# Patient Record
Sex: MALE | Race: BLACK OR AFRICAN AMERICAN
[De-identification: names, ages, dates, MRNs, and addresses within clinical notes are randomized per-mention and may not be internally consistent; named-entity substitution may affect disease eponyms.]

---

## 2017-05-09 ENCOUNTER — HOSPITAL ENCOUNTER (INPATIENT)
Dept: HOSPITAL 62 - ER | Age: 57
Discharge: ANOTHER HOSPITAL | DRG: 208 | End: 2017-05-09
Attending: FAMILY MEDICINE | Admitting: FAMILY MEDICINE
Payer: OTHER GOVERNMENT

## 2017-05-09 VITALS — SYSTOLIC BLOOD PRESSURE: 148 MMHG | DIASTOLIC BLOOD PRESSURE: 93 MMHG

## 2017-05-09 DIAGNOSIS — N17.9: ICD-10-CM

## 2017-05-09 DIAGNOSIS — M19.90: ICD-10-CM

## 2017-05-09 DIAGNOSIS — Z87.891: ICD-10-CM

## 2017-05-09 DIAGNOSIS — K85.90: ICD-10-CM

## 2017-05-09 DIAGNOSIS — J96.01: Primary | ICD-10-CM

## 2017-05-09 DIAGNOSIS — Z82.49: ICD-10-CM

## 2017-05-09 DIAGNOSIS — F43.10: ICD-10-CM

## 2017-05-09 DIAGNOSIS — E87.2: ICD-10-CM

## 2017-05-09 DIAGNOSIS — J44.9: ICD-10-CM

## 2017-05-09 DIAGNOSIS — I50.9: ICD-10-CM

## 2017-05-09 DIAGNOSIS — I11.0: ICD-10-CM

## 2017-05-09 DIAGNOSIS — E83.42: ICD-10-CM

## 2017-05-09 DIAGNOSIS — F10.20: ICD-10-CM

## 2017-05-09 DIAGNOSIS — I42.9: ICD-10-CM

## 2017-05-09 DIAGNOSIS — I21.19: ICD-10-CM

## 2017-05-09 DIAGNOSIS — Z79.899: ICD-10-CM

## 2017-05-09 DIAGNOSIS — Z78.1: ICD-10-CM

## 2017-05-09 DIAGNOSIS — Z84.89: ICD-10-CM

## 2017-05-09 DIAGNOSIS — E86.0: ICD-10-CM

## 2017-05-09 DIAGNOSIS — E72.20: ICD-10-CM

## 2017-05-09 LAB
ALBUMIN SERPL-MCNC: 5.1 G/DL (ref 3.5–5)
ALP SERPL-CCNC: 84 U/L (ref 38–126)
ALT SERPL-CCNC: 68 U/L (ref 21–72)
ANION GAP SERPL CALC-SCNC: 32 MMOL/L (ref 5–19)
ANION GAP SERPL CALC-SCNC: 39 MMOL/L (ref 5–19)
APPEARANCE UR: CLEAR
APTT BLD: 25.9 SEC (ref 23.5–35.8)
AST SERPL-CCNC: 85 U/L (ref 17–59)
BARBITURATES UR QL SCN: NEGATIVE
BASE EXCESS BLDA CALC-SCNC: -13.2 MMOL/L
BASE EXCESS BLDA CALC-SCNC: -15.1 MMOL/L
BASE EXCESS BLDV CALC-SCNC: -18.4 MMOL/L
BASOPHILS # BLD AUTO: 0.1 10^3/UL (ref 0–0.2)
BASOPHILS NFR BLD AUTO: 0.4 % (ref 0–2)
BILIRUB DIRECT SERPL-MCNC: 1.1 MG/DL (ref 0–0.4)
BILIRUB SERPL-MCNC: 2.2 MG/DL (ref 0.2–1.3)
BILIRUB UR QL STRIP: NEGATIVE
BUN SERPL-MCNC: 20 MG/DL (ref 7–20)
BUN SERPL-MCNC: 24 MG/DL (ref 7–20)
CALCIUM: 8.8 MG/DL (ref 8.4–10.2)
CALCIUM: 9.5 MG/DL (ref 8.4–10.2)
CHLORIDE SERPL-SCNC: 93 MMOL/L (ref 98–107)
CHLORIDE SERPL-SCNC: 94 MMOL/L (ref 98–107)
CK MB SERPL-MCNC: 12.7 NG/ML (ref ?–4.55)
CK MB SERPL-MCNC: 17.5 NG/ML (ref ?–4.55)
CK SERPL-CCNC: 766 U/L (ref 55–170)
CK SERPL-CCNC: 804 U/L (ref 55–170)
CO2 SERPL-SCNC: 12 MMOL/L (ref 22–30)
CO2 SERPL-SCNC: 8 MMOL/L (ref 22–30)
CREAT SERPL-MCNC: 1.74 MG/DL (ref 0.52–1.25)
CREAT SERPL-MCNC: 1.82 MG/DL (ref 0.52–1.25)
D DIMER PPP FEU-MCNC: 5.33 UG/ML (ref 0–0.5)
EOSINOPHIL # BLD AUTO: 0 10^3/UL (ref 0–0.6)
EOSINOPHIL NFR BLD AUTO: 0 % (ref 0–6)
ERYTHROCYTE [DISTWIDTH] IN BLOOD BY AUTOMATED COUNT: 15.7 % (ref 11.5–14)
ETHANOL SERPL-MCNC: < 10 MG/DL
GLUCOSE SERPL-MCNC: 139 MG/DL (ref 75–110)
GLUCOSE SERPL-MCNC: 275 MG/DL (ref 75–110)
GLUCOSE UR STRIP-MCNC: NEGATIVE MG/DL
HCO3 BLDV-SCNC: 10.2 MMOL/L (ref 20–32)
HCT VFR BLD CALC: 51.2 % (ref 37.9–51)
HGB BLD-MCNC: 16.9 G/DL (ref 13.5–17)
HGB HCT DIFFERENCE: -0.5
KETONES UR STRIP-MCNC: 80 MG/DL
LIPASE SERPL-CCNC: 9881.1 U/L (ref 23–300)
LYMPHOCYTES # BLD AUTO: 1.4 10^3/UL (ref 0.5–4.7)
LYMPHOCYTES NFR BLD AUTO: 9.1 % (ref 13–45)
MCH RBC QN AUTO: 31.4 PG (ref 27–33.4)
MCHC RBC AUTO-ENTMCNC: 33 G/DL (ref 32–36)
MCV RBC AUTO: 95 FL (ref 80–97)
METHADONE UR QL SCN: NEGATIVE
MONOCYTES # BLD AUTO: 1.2 10^3/UL (ref 0.1–1.4)
MONOCYTES NFR BLD AUTO: 7.8 % (ref 3–13)
NEUTROPHILS # BLD AUTO: 12.4 10^3/UL (ref 1.7–8.2)
NEUTS SEG NFR BLD AUTO: 82.7 % (ref 42–78)
NITRITE UR QL STRIP: NEGATIVE
PCO2 BLDV: 33.8 MMHG (ref 35–63)
PCP UR QL SCN: NEGATIVE
PH BLDV: 7.1 [PH] (ref 7.3–7.42)
PH UR STRIP: 5 [PH] (ref 5–9)
POTASSIUM SERPL-SCNC: 3 MMOL/L (ref 3.6–5)
POTASSIUM SERPL-SCNC: 3.4 MMOL/L (ref 3.6–5)
PROT SERPL-MCNC: 8.9 G/DL (ref 6.3–8.2)
PROT UR STRIP-MCNC: 30 MG/DL
PROTHROMBIN TIME: 13.2 SEC (ref 11.4–15.4)
RBC # BLD AUTO: 5.38 10^6/UL (ref 4.35–5.55)
SAO2 % BLDA: 95.2 % (ref 94–98)
SAO2 % BLDA: 99.5 % (ref 94–98)
SODIUM SERPL-SCNC: 137.9 MMOL/L (ref 137–145)
SODIUM SERPL-SCNC: 140.3 MMOL/L (ref 137–145)
SP GR UR STRIP: 1.01
TROPONIN I SERPL-MCNC: 0.03 NG/ML
TROPONIN I SERPL-MCNC: 0.04 NG/ML
TSH SERPL-ACNC: 2.05 UIU/ML (ref 0.47–4.68)
URINE OPIATES LOW: NEGATIVE
UROBILINOGEN UR-MCNC: NEGATIVE MG/DL (ref ?–2)
WBC # BLD AUTO: 15 10^3/UL (ref 4–10.5)

## 2017-05-09 PROCEDURE — 85730 THROMBOPLASTIN TIME PARTIAL: CPT

## 2017-05-09 PROCEDURE — C1751 CATH, INF, PER/CENT/MIDLINE: HCPCS

## 2017-05-09 PROCEDURE — 84484 ASSAY OF TROPONIN QUANT: CPT

## 2017-05-09 PROCEDURE — 96376 TX/PRO/DX INJ SAME DRUG ADON: CPT

## 2017-05-09 PROCEDURE — 80307 DRUG TEST PRSMV CHEM ANLYZR: CPT

## 2017-05-09 PROCEDURE — 94002 VENT MGMT INPAT INIT DAY: CPT

## 2017-05-09 PROCEDURE — 02HV33Z INSERTION OF INFUSION DEVICE INTO SUPERIOR VENA CAVA, PERCUTANEOUS APPROACH: ICD-10-PCS | Performed by: INTERNAL MEDICINE

## 2017-05-09 PROCEDURE — 83735 ASSAY OF MAGNESIUM: CPT

## 2017-05-09 PROCEDURE — 0BH17EZ INSERTION OF ENDOTRACHEAL AIRWAY INTO TRACHEA, VIA NATURAL OR ARTIFICIAL OPENING: ICD-10-PCS | Performed by: INTERNAL MEDICINE

## 2017-05-09 PROCEDURE — 93010 ELECTROCARDIOGRAM REPORT: CPT

## 2017-05-09 PROCEDURE — 84439 ASSAY OF FREE THYROXINE: CPT

## 2017-05-09 PROCEDURE — 87077 CULTURE AEROBIC IDENTIFY: CPT

## 2017-05-09 PROCEDURE — 96361 HYDRATE IV INFUSION ADD-ON: CPT

## 2017-05-09 PROCEDURE — 74000: CPT

## 2017-05-09 PROCEDURE — 85025 COMPLETE CBC W/AUTO DIFF WBC: CPT

## 2017-05-09 PROCEDURE — 80048 BASIC METABOLIC PNL TOTAL CA: CPT

## 2017-05-09 PROCEDURE — 83036 HEMOGLOBIN GLYCOSYLATED A1C: CPT

## 2017-05-09 PROCEDURE — 85379 FIBRIN DEGRADATION QUANT: CPT

## 2017-05-09 PROCEDURE — 84443 ASSAY THYROID STIM HORMONE: CPT

## 2017-05-09 PROCEDURE — 80053 COMPREHEN METABOLIC PANEL: CPT

## 2017-05-09 PROCEDURE — 5A1935Z RESPIRATORY VENTILATION, LESS THAN 24 CONSECUTIVE HOURS: ICD-10-PCS | Performed by: INTERNAL MEDICINE

## 2017-05-09 PROCEDURE — 93005 ELECTROCARDIOGRAM TRACING: CPT

## 2017-05-09 PROCEDURE — 87086 URINE CULTURE/COLONY COUNT: CPT

## 2017-05-09 PROCEDURE — 82803 BLOOD GASES ANY COMBINATION: CPT

## 2017-05-09 PROCEDURE — 83690 ASSAY OF LIPASE: CPT

## 2017-05-09 PROCEDURE — 83605 ASSAY OF LACTIC ACID: CPT

## 2017-05-09 PROCEDURE — 36415 COLL VENOUS BLD VENIPUNCTURE: CPT

## 2017-05-09 PROCEDURE — 82140 ASSAY OF AMMONIA: CPT

## 2017-05-09 PROCEDURE — 99291 CRITICAL CARE FIRST HOUR: CPT

## 2017-05-09 PROCEDURE — 96365 THER/PROPH/DIAG IV INF INIT: CPT

## 2017-05-09 PROCEDURE — 87040 BLOOD CULTURE FOR BACTERIA: CPT

## 2017-05-09 PROCEDURE — 81001 URINALYSIS AUTO W/SCOPE: CPT

## 2017-05-09 PROCEDURE — 82550 ASSAY OF CK (CPK): CPT

## 2017-05-09 PROCEDURE — 0D9670Z DRAINAGE OF STOMACH WITH DRAINAGE DEVICE, VIA NATURAL OR ARTIFICIAL OPENING: ICD-10-PCS | Performed by: FAMILY MEDICINE

## 2017-05-09 PROCEDURE — 82553 CREATINE MB FRACTION: CPT

## 2017-05-09 PROCEDURE — 96375 TX/PRO/DX INJ NEW DRUG ADDON: CPT

## 2017-05-09 PROCEDURE — 94640 AIRWAY INHALATION TREATMENT: CPT

## 2017-05-09 PROCEDURE — 51702 INSERT TEMP BLADDER CATH: CPT

## 2017-05-09 PROCEDURE — 85610 PROTHROMBIN TIME: CPT

## 2017-05-09 PROCEDURE — 71010: CPT

## 2017-05-09 RX ADMIN — PROPOFOL PRN ML: 10 INJECTION, EMULSION INTRAVENOUS at 11:55

## 2017-05-09 RX ADMIN — MAGNESIUM SULFATE IN DEXTROSE SCH ML: 10 INJECTION, SOLUTION INTRAVENOUS at 05:14

## 2017-05-09 RX ADMIN — MAGNESIUM SULFATE IN DEXTROSE SCH ML: 10 INJECTION, SOLUTION INTRAVENOUS at 09:59

## 2017-05-09 RX ADMIN — PROPOFOL PRN ML: 10 INJECTION, EMULSION INTRAVENOUS at 09:02

## 2017-05-09 RX ADMIN — SODIUM CHLORIDE PRN ML: 9 INJECTION, SOLUTION INTRAVENOUS at 08:53

## 2017-05-09 RX ADMIN — MAGNESIUM SULFATE IN DEXTROSE SCH ML: 10 INJECTION, SOLUTION INTRAVENOUS at 06:14

## 2017-05-09 RX ADMIN — POTASSIUM CHLORIDE SCH ML: 29.8 INJECTION, SOLUTION INTRAVENOUS at 09:53

## 2017-05-09 RX ADMIN — POTASSIUM CHLORIDE SCH ML: 29.8 INJECTION, SOLUTION INTRAVENOUS at 11:37

## 2017-05-09 RX ADMIN — SODIUM CHLORIDE PRN ML: 9 INJECTION, SOLUTION INTRAVENOUS at 08:31

## 2017-05-09 RX ADMIN — MAGNESIUM SULFATE IN DEXTROSE SCH ML: 10 INJECTION, SOLUTION INTRAVENOUS at 11:41

## 2017-05-09 NOTE — PDOC TRANSFER SUMMARY
General


Admission Date/PCP: 


  05/09/17 07:47





  CALLY RIZVI, 





Transfer Date: 05/09/17


Accepting Facility: [facility]


Resuscitation Status: Full Code





- Transfer Diagnosis


(1) STEMI (ST elevation myocardial infarction)


Is this a current diagnosis for this admission?: Yes





(2) Respiratory failure


Is this a current diagnosis for this admission?: Yes





(3) Hypomagnesemia


Is this a current diagnosis for this admission?: Yes





(4) Alcohol dependency


Is this a current diagnosis for this admission?: Yes





(5) Hypertension


Is this a current diagnosis for this admission?: Yes





(6) High anion gap metabolic acidosis


Is this a current diagnosis for this admission?: Yes





(7) Elevated lipase


Is this a current diagnosis for this admission?: Yes





(8) Hyperammonemia


Is this a current diagnosis for this admission?: Yes








- Transfer Medications


Home Medications: 


 





Unobtainable [Unobtainable]  05/09/17 








Transfer Medications: 


 Current Medications





Acetaminophen (Tylenol 325 Mg Tablet)  325 mg NG Q4HP PRN


   PRN Reason: FOR PAIN OR TEMP


   Stop: 06/08/17 07:46


Acetylcysteine (Mucomist 20% Soln 800 Mg/4 Ml)  600 mg NEB RTBID LISSET


   Stop: 06/08/17 19:59


Albuterol/Ipratropium (Duoneb 3 Ml Ampul)  3 ml NEB RTQ6 LISSET


   Stop: 06/08/17 07:59


   Last Admin: 05/09/17 09:50 Dose:  3 ml


Atorvastatin Calcium (Lipitor 80 Mg Tablet)  80 mg NG QHS LISSET


   Stop: 06/08/17 21:59


Dextrose (Dextrose Inj 50% Syringe (25 Gm/50 Ml))  12.5 gm IV PRN PRN; Protocol


   PRN Reason: FOR BG 50-69 IN ALERT PATIENT


   Stop: 06/08/17 07:51


Dextrose (Dextrose Inj 50% Syringe (25 Gm/50 Ml))  25 gm IV PRN PRN


   PRN Reason: Protocol


   Stop: 06/08/17 07:51


Fentanyl Citrate (Sublimaze Inj/Pf 100 Mcg/2 Ml Ampule)  50 mcg IV Q4HP PRN


   PRN Reason: PAIN


   Stop: 05/16/17 07:53


Glucagon (Glucagen Inj 1 Mg Vial)  1 mg IM PRN PRN; Protocol


   PRN Reason: Evaluate for BG < 70


   Stop: 06/08/17 07:51


Glucose (Glutose 40% Gel 15 Gm Tube)  30 gm PO PRN PRN; Protocol


   PRN Reason: FOR BG < 50 IN ALERT PATIENT 


   Stop: 06/08/17 07:51


Glucose (Glutose 40% Gel 15 Gm Tube)  15 gm PO PRN PRN; Protocol


   PRN Reason: FOR BG 50-69 IN ALERT PATIENT


   Stop: 06/08/17 07:51


Heparin Sodium (Porcine) (Heparin Inj 5,000 Units/Ml 1 Ml Syringe)  5,000 unit 

SUBCUT Q8 LISSET


   Stop: 06/08/17 13:59


Heparin Sodium (Porcine) (Heparin Inj 1,000 Unit/Ml 10 Ml Vial)  0 - 15,000 

unit IV .BOLUS PER PROTOCOL PRN; Protocol


   PRN Reason: RESPOND TO aPTT VALUE


   Stop: 06/08/17 08:37


Magnesium Sulfate/Dextrose (Magnesium Sulfate Rtu-D5w 1 Gm/100 Ml Premix)  1 gm 

in 100 mls @ 100 mls/hr IV Q1H [facility]


   Stop: 05/09/17 12:59


   Last Admin: 05/09/17 09:59 Dose:  100 ml


Potassium Chloride/Water (Potassium Chloride Royce 20 Meq/50 Ml)  20 meq in 50 

mls @ 25 mls/hr IV Q2H [facility]


   Stop: 05/09/17 14:29


   Last Admin: 05/09/17 09:53 Dose:  50 ml


Midazolam HCl (Versed Rtu 50 Mg/100 Ml Premix Bag)  100 mls @ 0 mls/hr IV 

CONTINUOUS PRN; Protocol; Titrate


   PRN Reason: THIS MED IS NOT "PRN"


   Stop: 05/16/17 07:44


Propofol (Diprivan Rtu 1000 Mg/100 Ml Inf.Bottle)  100 mls @ 0 mls/hr IV 

CONTINUOUS PRN; Protocol; Titrate


   PRN Reason: THIS MED IS NOT "PRN"


   Stop: 06/08/17 07:44


   Last Admin: 05/09/17 09:02 Dose:  100 ml


Thiamine HCl 100 mg/ Folic (Acid 1 mg/ Sodium Chloride)  51.2 mls @ 100 mls/hr 

IV DAILY [facility]


   Stop: 06/08/17 09:59


   Last Admin: 05/09/17 09:59 Dose:  100 mg


Heparin Sodium/Dextrose (Heparin Rtu 25,000 Unit/250 Ml D5w Premix)  250 mls @ 

0 mls/hr IV CONTINUOUS PRN; Protocol; Titrate


   PRN Reason: THIS MED IS NOT "PRN"


   Stop: 06/08/17 08:37


   Last Admin: 05/09/17 11:17 Dose:  250 ml


Sodium Chloride (Nacl 0.9% 1000 Ml Iv Soln)  3,000 mls @ 200 mls/hr IV BOLUS ONE


   Stop: 05/09/17 23:47


Sodium Chloride (Nacl 0.9% 1000 Ml Iv Soln)  1,000 mls @ 100 mls/hr IV 

CONTINUOUS PRN


   PRN Reason: THIS MED IS NOT "PRN"


   Stop: 06/08/17 07:46


Sodium Bicarbonate 150 meq/ (Dextrose)  1,000 mls @ 100 mls/hr IV CONTINUOUS PRN


   PRN Reason: THIS MED IS NOT "PRN"


   Stop: 06/08/17 11:01


Insulin Human Lispro (Humalog Insulin 100 Unit/1 Ml 3 Ml Vial)  0 - 12 unit 

SUBCUT Q6HP PRN


   PRN Reason: Protocol


   Stop: 06/08/17 07:51


Levalbuterol HCl (Xopenex Neb 1.25 Mg/3 Ml Ampul)  1.25 mg NEB RTQ2HP PRN


   PRN Reason: SHORTNESS OF BREATH


   Stop: 06/08/17 07:46


Lorazepam (Ativan Inj 2 Mg/1 Ml Vial)  2 mg IV Q2HP PRN


   PRN Reason: AGITATION


   Stop: 05/16/17 07:53


Methylprednisolone Sodium Succinate (Solu-Medrol Inj/Pf 125 Mg/2 Ml Sdv)  125 

mg IV Q8 LISSET


   Stop: 06/08/17 13:59


Metoprolol Tartrate (Lopressor Inj/Pf 5 Mg/5 Ml Sdv)  2.5 mg IV Q6 LISSET


   Stop: 06/08/17 11:59


Ondansetron HCl (Zofran Inj/Pf 4 Mg/2 Ml Sdv)  4 mg IV Q6HP PRN


   PRN Reason: FOR NAUSEA/VOMITING


   Stop: 06/08/17 07:46


Pantoprazole Sodium (Protonix Iv Inj 40 Mg Vial)  40 mg IV Q12 LISSET


   Stop: 05/12/17 09:59


Pharmacy Profile Note (Medication Communication Order)  1 each  .NOTICE NR


   Stop: 06/08/17 07:44


Sodium Bicarbonate (Sodium Bicarbonate 8.4% Inj 50 Meq/50ml Syrin)  50 meq IV 

NOW ONE


   Stop: 05/09/17 11:31


   Last Admin: 05/09/17 11:07 Dose:  50 meq


Sodium Chloride (Saline Flush 2.5 Ml Monoject Prefil Syrin)  2.5 ml IV Q8 LISSET


   Stop: 06/08/17 13:59











- Allergies


Allergies/Adverse Reactions: 


 





No Known Allergies Allergy (Verified 05/09/17 05:01)


 











Hospital Course


Hospital Course: 


Patient is a 56-year-old American male who was seen this morning at the request 

of the emergency department.   History is unobtainable secondary to this and 

all history is obtained via the ER documentation, review of records, and 

discussion with the ER physician, .  Patient presented with 

complaints of shortness of breath was found be saturating 87% on room air.  

Patient received SoluMedrol, DuoNeb, and Ativan and subsequently began having 

acute respiratory distress and was intubated.  Any remaining history is 

obtained from the record.  





Patient was then transferred to the ICU.  Evaluation was continued here and 

repeat EKG revealed a inferior STEMI with reciprocal depressions.  Patient was 

given aspirin, started on heparin, given Lipitor and immediate transfer was 

established for this patient.  Cardiology, Dr. DUQUE, evaluated this patient has 

at this time recommends against lytic therapy due to patient's risk for 

bleeding due to his underlying alcoholism and inability to obtain a history.





Patient was hospitalized in this facility in October 2016 for delirium tremens 

and possible seizure and during that evaluation patient was found to have a 

history of cardiomyopathy with an grossly normal EF in 2015 and mild diastolic 

dysfunction.  Patient at that time had been restarted on his medications and 

discharged in stable condition.  Currently it is unknown if take patient takes 

any medications, but per review of his previous stay patient is to be on Coreg.





Physical Exam


Vital Signs: 


 











Temp Pulse Resp BP Pulse Ox


 


 97.8 F   130 H  18   150/106 H  100 


 


 05/09/17 04:20  05/09/17 04:07  05/09/17 09:37  05/09/17 09:37  05/09/17 09:37











Exam: 


General: Intubated and sedated


HEENT: AT/NC, PERRL, EOMI, oropharynx is moist, pink, mild scleral icterus, no 

conjunctival injection


Neck: + JVD, trachea midline


Chest: Clear to auscultation bilaterally, no wheezes rhonchi or rales


CV: Tachycardic, Regular rate and rhythm, normal S1 and S2, no rub or gallop; +

sm llsb


Abdomen: Soft, distended, active bowel sounds; no rigidity


Extremities: No cyanosis, clubbing or edema








Results


Laboratory Results: 


 











  05/09/17 05/09/17 05/09/17





  08:45 08:45 08:54


 


Carbonic Acid   


 


HCO3/H2CO3 Ratio   


 


ABG pH   


 


ABG pCO2   


 


ABG pO2   


 


ABG HCO3   


 


ABG O2 Saturation   


 


ABG Base Excess   


 


FiO2   


 


Ammonia   52.1 H 


 


Lipase  9881.1 H  


 


Urine Color    YELLOW


 


Urine Appearance    CLEAR


 


Urine pH    5.0


 


Ur Specific [place]    1.011


 


Urine Protein    30 H


 


Urine Glucose (UA)    NEGATIVE


 


Urine Ketones    80 H


 


Urine Blood    LARGE H


 


Urine Nitrite    NEGATIVE


 


Ur Leukocyte Esterase    NEGATIVE


 


Urine WBC (Auto)    0


 


Urine RBC (Auto)    1














  05/09/17





  09:45


 


Carbonic Acid  0.79 L


 


HCO3/H2CO3 Ratio  13:1


 


ABG pH  7.22 L


 


ABG pCO2  26.4 L


 


ABG pO2  270.5 H


 


ABG HCO3  10.7 L


 


ABG O2 Saturation  99.5 H


 


ABG Base Excess  -15.1


 


FiO2  100%


 


Ammonia 


 


Lipase 


 


Urine Color 


 


Urine Appearance 


 


Urine pH 


 


Ur Specific Gravity 


 


Urine Protein 


 


Urine Glucose (UA) 


 


Urine Ketones 


 


Urine Blood 


 


Urine Nitrite 


 


Ur Leukocyte Esterase 


 


Urine WBC (Auto) 


 


Urine RBC (Auto) 








 











  05/09/17 05/09/17





  08:45 08:45


 


Creatine Kinase  766 H 


 


CK-MB (CK-2)   12.70 H


 


Troponin I   0.032











EKG Comments: 


EKG--inferior STEMI, reciprocal depression


Impressions: 


 





KUB X-Ray  05/09/17 07:44


IMPRESSION:  Nasogastric tube extends below the diaphragm.  Bowel distention 

noted.


 








Chest X-Ray  05/09/17 09:47


IMPRESSION:  Endotracheal tube tip midtrachea.


Nasogastric tube tip in the distal esophagus.


Minimal retrocardiac atelectasis


 














Plan


Time Spent: Greater than 30 Minutes

## 2017-05-09 NOTE — CONSULTATION REPORT E
Consultation Report



NAME: KRISTA DAILY

MRN:  [id number]               : 1960      AGE: 56Y

DATE: 2017     602  A



TO:   KIEL ALFORD M.D.



FROM: ALY GALO M.D.

      Requesting Physician



CHIEF COMPLAINT:

Patient admitted with acute hypoxic respiratory failure, now with EKG

showing ST-segment elevation in I and aVL leads and mild ST-segment

depression in inferior leads which is very suggestive of an ST-elevation

MI.



HISTORY OF PRESENT ILLNESS:

Note that the patient is intubated and sedated, and there are no relatives

around to get a history.  History obtained from the patient's chart and

also with discussions with Dr. Galo, the hospitalist on the case.



The patient is a 56-year-old male who came to the Emergency Room with

shortness of breath, and on room air, his O2 saturation was 87%.  In spite

of Solu-Medrol and Ativan, the patient went into acute respiratory failure

and was intubated.  His EKG at 8:45 a.m. this morning showed ST-segment

elevation in the inferior leads suggestive of acute inferior MI.  Also,

there was some mild ST-segment depression in I and aVL, but the patient's

lipase was also very high in 9000 plus range, and this also could give

rise to sometimes ST-segment elevation in inferior leads suggestive of a

pseudo ST-elevation MI in the inferior wall.  At present, the patient is

intubated.  Blood pressure is stable.  He also has significant metabolic

acidosis, see lab results below.



PAST MEDICAL HISTORY:

As per the chart is positive for history of congestive heart failure,

history of cardiomyopathy, history of hypertension.  There is no history

of coronary artery disease or MI.  The patient has a history of COPD.  He

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  He has a history of

alcohol dependency, post-traumatic stress disorder.



PAST SURGICAL HISTORY:

Orthopedic surgery in the neck.



SOCIAL HISTORY:

The patient is a former smoker.  The patient drinks heavily, last reported

a fifth of gin a day.



FAMILY HISTORY:

Positive for hypertension and mother  at age 74 with Alzheimer's.



ALLERGIES:

No known allergies.



ADVANCED DIRECTIVE:

The patient is a FULL CODE, but as per the chart, his spouse is his

surrogate healthcare decision maker, but this cannot confirmed with the

patient.



REVIEW OF SYSTEMS:

Not obtainable due to the patient being intubated and sedated, and there

are no relatives around.



MEDICATIONS:

1.  Mucomyst 600 mg nebulizer treatment b.i.d.

2.  Albuterol sulfate 2.5 mg nebulizer treatment x1.

3.  Aspirin 300 mg per rectally x1.

4.  Atorvastatin 80 mg at bedtime.

5.  He is on hypoglycemic precautions with dextrose 30 mg p.o. p.r.n. that

is glucose 40% gel and also glucose 40% gel 15 g p.o. p.r.n. hypoglycemia.

6.  He is on dextrose 50% 12.5 g p.r.n.

7.  He is on dextrose 25 mg IV p.r.n.

8.  He did get 1 dose of Vasotec 0.625 mg IV.

9.  He is on fentanyl 50 mcg IV q.4 hours p.r.n.

10.  Glucagon 1 mg IM p.r.n.

11.  He is on heparin 5000 units subcutaneously q.8 hours.

12.  He is on IV midazolam drip 100 mL continuous to keep the patient

sedated.

13.  The patient received at least 4000 mL of normal saline IV bolus, and

the patient's normal saline is at 100 mL per hour.  He received sodium

bicarbonate x2.

14.  He is also on Accu-Chek q.6 hours with sliding scale insulin

coverage.

15.  He is on ipratropium nebulizer treatment x1.

16.  He is on Xopenex 1.25 mg nebulizer treatment q.2 hours.

17.  He is on lorazepam 2 mg IV x1.

18.  He is also on methylprednisolone 125 mg IV x1 and 125 mg IV q.8

hours.

19.  He is on metoprolol 2.5 mg IV q.6 hours.

20.  Midazolam 2 mg IV p.r.n.

21.  He is on pantoprazole sodium (Protonix) 40 mg IV q.12 hours.

22.  He is on potassium chloride 40 mEq p.o. x1 and 40 mEq via NG tube x1.

23.  He received 3 amps of sodium bicarbonate 50 mEq IV and also sodium

bicarbonate 150 mEq in 1000 mL of dextrose to run at 75 mL per hour.

24.  He also received succinylcholine for intubation.



PHYSICAL EXAMINATION:

GENERAL:  On examination at present, the patient is intubated and sedated.

He is not fighting the ventilator.  The patient is well built but looks

chronically ill.



VITAL SIGNS:  He is afebrile with a temperature of 96.6 degrees

Fahrenheit.  His pulse is 96 beats per minute, regular sinus rhythm. 

Blood pressure is 143/98.  Respirations 20 per minute.  O2 saturations are

98% on FiO2 of 45%.



HEENT:  Head is atraumatic, normocephalic.  Eyes:  Pupils are equal,

round, regular, reactive to light.  ENT not examined due to the patient

being intubated and sedated.



NECK:  Supple.  There is no JVD.  Carotids are equal; there is no bruit. 

There is no lymphadenopathy.  There is no goiter.  Trachea is central.



LUNGS:  Clear to auscultation and percussion.



CARDIOVASCULAR:  S1, S2 are heard.  There is no S3 gallop.  There is no S4

gallop.  There is systolic murmur left sternal border and the apex.  There

is no rub.



ABDOMEN:  Soft and nontender.  There is no hepatosplenomegaly.  Bowel

sounds are well heard.  There are no tender areas or masses.



EXTREMITIES:  Femorals are slightly diminished.  There are no femoral

bruits.  Leg pulses are diminished.  There is no pedal edema.  There is no

DVT or cellulitis.



CENTRAL NERVOUS SYSTEM:   The patient is sedated, hence not examined.



PSYCHIATRIC:  The patient is sedated, hence not examined.

 

DIAGNOSTIC TESTS:

The patient's EKG a few months ago showed sinus rhythm without ST-segment

elevation in the inferior leads.  This morning at 8:38 a.m. showed

ST-segment elevation in the inferior leads and mild ST-segment depression

in I and aVL which is definitely abnormal.



His chest x-ray shows endotracheal tube tip 3 cm above the rylee.  There

is nasogastric tube in the lower third of the esophagus.  Suspected

retrocardiac hiatal hernia.  Minimal left retrocardiac atelectasis.  No

gross pleural effusions or pneumothorax.  Heart size is normal.  Normal

vasculature.  Stomach is decompressed under the left hemidiaphragm.



Other labs noted.  The KUB shows nasogastric tube extends below the

diaphragm.  The tip is in the body of the stomach.  Bowel distention is

noted including large and small bowel and with slight gastric distention

as well.  Lung bases appear clear.  The tip of the endotracheal tube is

above the rylee.



The patient's white count is 15,000; his hemoglobin is 16.9; hematocrit is

41.2; his platelet count is 150,000.  The patient's sodium is 140.3,

potassium 3.0.  Chloride is 93.  CO2 is 8.  The patient's BUN is 20;

creatinine is 1.82.  GFR is reduced at 39 mL; this seems to be an acute

renal failure.  On the previous admissions, the patient's BUN and

creatinine were normal.  The patient's AST was elevated at 85.  The

patient's total bilirubin was 2.2.  Direct bilirubin was 1.1.  His ALT was

normal at 68.  Alkaline phosphatase was normal 84.  His CPK-MB was

elevated at 17.50.  His cardiac enzymes were negative.  Troponin-I was

negative at 0.036, and subsequently, I found out that it was 0.022.  His

free T4 was 1.35, and his TSH was 2.05.  The patient's lipase was 9881.1

which is high.  CK was 766.  The patient's ProTime is 13.2.  INR is 0.97. 

D-dimer is 5.36.  PTT is 25.9.  The patient's salicylate was less than 1. 

The patient's acetaminophen was less than 10.  Alcohol was less than 10. 

His urine marijuana, urine cocaine, urine benzodiazepine, urine

amphetamine, urine phencyclidine, urine barbiturate screens were all

negative.  Urine methadone screen was negative.  Urine opiate screen was

negative.



IMPRESSION:

1.  Acute ST-elevation myocardial infarction.  Recommend to start the

patient on heparin.  Continue beta blocker.  We will avoid

angiotensin-converting enzyme inhibitors for the first 24 hours and also

start the patient on aspirin.  Would not use Lasix on this patient in view

of the patient's history of alcoholism and his lipase being elevated and

also no history available of a prior history of gastrointestinal bleed or

any other bleed.  Hence, it would be dangerous.  Would recommend transfer

to tertiary care center for further treatment.

2.  Acute pancreatitis with elevated lipase.  This can also cause inferior

ST-elevation myocardial infarction type of picture.  Hence, this has to be

kept in mind.

3.  Respiratory failure with hypoxemia.  Would recommend continuing the

patient on ventilator treatment and also steroids.  Would recommend

antibiotics and also recommend Xopenex treatment.

4.  Acute renal failure most likely secondary to dehydration.

5.  Hypertension.

6.  High anion gap metabolic acidosis.

7.  Elevated lipase secondary to acute pancreatitis.

8.  Hyperammonemia.

9.  Hypomagnesemia.  Note the patient's magnesium was 1.4.

10.  Alcohol dependency.

11.  Lactic acidosis with metabolic acidosis.  The patient's lactic acid

is elevated at 3.2.



RECOMMENDATIONS:

As mentioned earlier, we will transfer the patient to a tertiary care

center.  Would not use Lasix as mentioned earlier.  Would continue the

patient on IV metoprolol and IV heparin and aspirin and other supportive

treatment including antibiotics and IV fluids.



Note that the patient is being transferred to [facility]. 

Later I called Dr. Bruno Radford of [facility] and explained

to him that the possibility this might be an ST-elevation MI versus

secondary to severe pancreatitis causing ST-segment elevation mimicking

his previous MI.



Note I saw the patient from 10:50 a.m. to 11:30 a.m., 40 minutes of care

given to the patient and medications reviewed, and the treatment plan was

discussed with attending physician, Dr. Galo.  Also, the patient's

medications were reviewed, and more than 50% of the time was spent on

direct patient care.  This case was very highly complex medical decision

making in view of the ST-segment elevation in the inferior leads with

differential diagnosis being secondary to ST-elevation MI in the inferior

wall versus ST-elevation previously followed by pancreatitis.  Also,

discussed with Dr. Radford at length.



40 minutes spent on this patient.



The patient is being transferred.  An echocardiogram was ordered but could

not be done since the flight ambulance personnel were here to take the

patient to [facility].









DICTATING PHYSICIAN: KIEL ALFORD M.D.





[id number]                  DT: 2017

SAVANNAH#: [id number]            DD: 2017

ID:   [id number]           JOB#: [id number]      ACCT: [id number]



cc:KIEL ALFORD M.D.

>







MTDD

## 2017-05-09 NOTE — EKG REPORT
SEVERITY:- ABNORMAL ECG -

SINUS TACHYCARDIA

VENTRICULAR PREMATURE COMPLEX

ST ELEVATION, PROBABLE INFERIOR INJURY

BORDERLINE R WAVE PROGRESSION, ANTERIOR LEADS

LATERAL LEADS ARE ALSO INVOLVED

:

Confirmed by: Heath La 09-May-2017 16:59:09

## 2017-05-09 NOTE — PDOC PROGRESS REPORT
Bedside Procedure





- Central Line


  ** Right Internal jugular


Time completed: 11:05


Consent obtained: No - immergent


Central line pre-insertion: Sterile PPE donned, Betadine prep applied, 

Chloraprep applied, Sterile drapes applied


Central line lumen type: Triple


Anesthetic type: 1% Lidocaine


Ultrasound guided: Yes


Line secured with sutures: Yes


Central line post-insertion: Blood return from lumens, Sutured, Sterile 

dressing applied, Position confirmed w/ CXR


Complications: No

## 2017-05-09 NOTE — PDOC CONSULTATION
Consultation


Consult Date: 17


Attending physician:: ALY GALO


Consult reason:: resp failure





History of Present Illness


Admission Date/PCP: 


  17 07:47





  CALLY RIZVI DO





History of Present Illness: 


all information from chart KRISTA DAILY is a 56 year old male who is currently 

intubated in the emergency department.  History is unobtainable secondary to 

this and all history is obtained via the ER documentation, review of records, 

and discussion with the ER physician, .  Patient barely presented 

with complaints of shortness of breath was found be saturating 87% on room air.

  Patient received SoluMedrol, DuoNeb, and Ativan and subsequently began having 

acute respiratory distress and was intubated.  Any remaining history is 

obtained from the record.  Patient is currently intubated and sedated.profound 

met acidosis,acute EKG changes significant hypoxemia








Past Medical History


Cardiac Medical History: Reports: Congestive Heart Failure - Cardiomyopathy, 

Hypertension


   Denies: Coronary Artery Disease, Myocardial Infarction


Pulmonary Medical History: Reports: Chronic Obstructive Pulmonary Disease (COPD)


   Denies: Asthma, Bronchitis, Pneumonia


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


Musculoskeltal Medical History: Reports: Arthritis


Psychiatric Medical History: Reports: Alcohol Dependency, Post Traumatic Stress 

Disorder


   Denies: Depression


Hematology: 


   Denies: Anemia





Past Surgical History


Past Surgical History: Reports: Orthopedic Surgery - neck





Social History


Information Source: [facility] Records


Smoking Status: Unknown if Ever Smoked


Frequency of Alcohol Use: Heavy


Hx Recreational Drug Use: No


Drugs: None


Hx Prescription Drug Abuse: No





- Advance Directive


Resuscitation Status: Full Code





Family History


Family History: Reviewed & Not Pertinent, Hypertension, Other - Mom  

age 74 Alzheimer's


Parental Family History Reviewed: No


Children Family History Reviewed: No


Sibling(s) Family History Reviewed.: No





Medication/Allergy


Home Medications: 








Unobtainable [Unobtainable]  17 








Allergies/Adverse Reactions: 


 





No Known Allergies Allergy (Verified 17 05:01)


 











Review of Systems


ROS unobtainable: Due to endotracheal tube





Physical Exam


Vital Signs: 


 











Temp Pulse Resp BP Pulse Ox


 


 97.8 F   130 H  18   150/106 H  100 


 


 17 04:20  17 04:07  17 09:37  17 09:37  17 09:37











General appearance: PRESENT: no acute distress, disheveled, well-developed, well

-nourished


Head exam: PRESENT: atraumatic, normocephalic


Eye exam: PRESENT: conjunctiva pale, EOMI


Mouth exam: PRESENT: dry mucosa, neck supple, other - ET tube in place


Neck exam: ABSENT: carotid bruit, JVD, lymphadenopathy, thyromegaly


Respiratory exam: PRESENT: rhonchi, symmetrical, tachypnea


Cardiovascular exam: PRESENT: RRR, +S1, +S2


Pulses: PRESENT: normal radial pulses


GI/Abdominal exam: PRESENT: normal bowel sounds, soft.  ABSENT: distended, 

guarding, mass, organolmegaly, rebound, tenderness


Rectal exam: PRESENT: deferred


Gentrourinary exam: PRESENT: indwelling catheter


Musculoskeletal exam: PRESENT: other - eschar over L knee


Skin exam: PRESENT: dry, warm





Results


Laboratory Results: 


 











  17





  08:45 08:45 08:54


 


Carbonic Acid   


 


HCO3/H2CO3 Ratio   


 


ABG pH   


 


ABG pCO2   


 


ABG pO2   


 


ABG HCO3   


 


ABG O2 Saturation   


 


ABG Base Excess   


 


FiO2   


 


Ammonia   52.1 H 


 


Lipase  9881.1 H  


 


Urine Color    YELLOW


 


Urine Appearance    CLEAR


 


Urine pH    5.0


 


Ur Specific [place]    1.011


 


Urine Protein    30 H


 


Urine Glucose (UA)    NEGATIVE


 


Urine Ketones    80 H


 


Urine Blood    LARGE H


 


Urine Nitrite    NEGATIVE


 


Ur Leukocyte Esterase    NEGATIVE


 


Urine WBC (Auto)    0


 


Urine RBC (Auto)    1














  17





  09:45


 


Carbonic Acid  0.79 L


 


HCO3/H2CO3 Ratio  13:1


 


ABG pH  7.22 L


 


ABG pCO2  26.4 L


 


ABG pO2  270.5 H


 


ABG HCO3  10.7 L


 


ABG O2 Saturation  99.5 H


 


ABG Base Excess  -15.1


 


FiO2  100%


 


Ammonia 


 


Lipase 


 


Urine Color 


 


Urine Appearance 


 


Urine pH 


 


Ur Specific Gravity 


 


Urine Protein 


 


Urine Glucose (UA) 


 


Urine Ketones 


 


Urine Blood 


 


Urine Nitrite 


 


Ur Leukocyte Esterase 


 


Urine WBC (Auto) 


 


Urine RBC (Auto) 








 











  17





  08:45 08:45


 


Creatine Kinase  766 H 


 


CK-MB (CK-2)   12.70 H


 


Troponin I   0.032











Impressions: 


 





KUB X-Ray  17 07:44


IMPRESSION:  Nasogastric tube extends below the diaphragm.  Bowel distention 

noted.


 








Chest X-Ray  17 09:47


IMPRESSION:  Endotracheal tube tip midtrachea.


Nasogastric tube tip in the distal esophagus.


Minimal retrocardiac atelectasis


 














Assessment & Plan





- Diagnosis


(1) High anion gap metabolic acidosis


Is this a current diagnosis for this admission?: YesPlan: 


large diff dx mudpiles








(2) Respiratory failure


Qualifiers: 


     Chronicity: acute     Respiratory failure complication: hypoxia     

Qualified Code(s): J96.01 - Acute respiratory failure with hypoxia  


Is this a current diagnosis for this admission?: Yes





(3) STEMI (ST elevation myocardial infarction)


Qualifiers: 


     Involved coronary artery: unspecified coronary artery        Qualified Code

(s): I21.3 - ST elevation (STEMI) myocardial infarction of unspecified site  


Is this a current diagnosis for this admission?: YesPlan: 


transferred to [facility] care per pcp











- Time


Critical Time spent with patient: 25-34 minutes - 120 min

## 2017-05-09 NOTE — PDOC H&P
History of Present Illness


Admission Date/PCP: 


  17 07:47





  CALLY RIZVI DO





History of Present Illness: 


KRISTA DAILY is a 56 year old male who is currently intubated in the emergency 

department.  History is unobtainable secondary to this and all history is 

obtained via the ER documentation, review of records, and discussion with the 

ER physician, .  Patient barely presented with complaints of 

shortness of breath was found be saturating 87% on room air.  Patient received 

SoluMedrol, DuoNeb, and Ativan and subsequently began having acute respiratory 

distress and was intubated.  Any remaining history is obtained from the record.

  Patient is currently intubated and sedated.








Past Medical History


Cardiac Medical History: Reports: Congestive Heart Failure - Cardiomyopathy, 

Hypertension


   Denies: Coronary Artery Disease, Myocardial Infarction


Pulmonary Medical History: Reports: Chronic Obstructive Pulmonary Disease (COPD)


   Denies: Asthma, Bronchitis, Pneumonia


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


Musculoskeltal Medical History: Reports: Arthritis


Psychiatric Medical History: Reports: Alcohol Dependency, Post Traumatic Stress 

Disorder


   Denies: Depression


Hematology: 


   Denies: Anemia





Past Surgical History


Past Surgical History: Reports: Orthopedic Surgery - neck





Social History


Smoking Status: Former Smoker


Frequency of Alcohol Use: Heavy


Amount of Alcoholic Beverages Per Day: last reported is a fifth of gin a day


Hx Recreational Drug Use: No


Drugs: None


Hx Prescription Drug Abuse: No





- Advance Directive


Resuscitation Status: Full Code


Surrogate healthcare decision maker:: 


Unable to obtain





Family History


Family History: Reviewed & Not Pertinent, Hypertension, Other - Mom  

age 74 Alzheimer's


Parental Family History Reviewed: No - unable to obtain


Children Family History Reviewed: No


Sibling(s) Family History Reviewed.: No





Medication/Allergy


Home Medications: 








Unobtainable [Unobtainable]  17 








Allergies/Adverse Reactions: 


 





No Known Allergies Allergy (Verified 17 05:01)


 











Review of Systems


ROS unobtainable: Due to endotracheal tube





Physical Exam


Vital Signs: 


 











Temp Pulse Resp BP Pulse Ox


 


 97.8 F   130 H  18   150/106 H  100 


 


 17 04:20  17 04:07  17 09:37  17 09:37  17 09:37











General appearance: PRESENT: well-developed, well-nourished, other - Intubated 

and sedated


Head exam: PRESENT: atraumatic, normocephalic


Eye exam: PRESENT: conjunctiva pink, scleral icterus.  ABSENT: conjunctival 

injection, periorbital swelling, PERRLA - Pupils are minimally reactive miotic.


Ear exam: PRESENT: normal external ear exam


Mouth exam: PRESENT: dry mucosa, tongue midline


Neck exam: ABSENT: carotid bruit, JVD, lymphadenopathy, thyromegaly, tracheal 

deviation


Respiratory exam: PRESENT: clear to auscultation dax.  ABSENT: rales, rhonchi, 

wheezes


Cardiovascular exam: PRESENT: RRR, +S1, +S2, systolic murmur, tachycardia.  

ABSENT: clicks, diastolic murmur, gallop, rubs


Pulses: PRESENT: normal dorsalis pedis pul


Vascular exam: PRESENT: normal capillary refill


GI/Abdominal exam: PRESENT: distended, normal bowel sounds, soft.  ABSENT: firm

, guarding, mass, Garza's sign, organolmegaly, rebound, rigid, tenderness


Rectal exam: PRESENT: deferred


Extremities exam: PRESENT: full ROM.  ABSENT: calf tenderness, clubbing, pedal 

edema


Neurological exam: PRESENT: other - Intubated and sedated


Psychiatric exam: PRESENT: other - Intermittent and sedated


Skin exam: PRESENT: dry, intact, rash - Patient has maculo papular rash on 

dorsum of his right foot, warm.  ABSENT: cyanosis





Results


Laboratory Results: 


 











  17





  08:45 08:45 08:54


 


Carbonic Acid   


 


HCO3/H2CO3 Ratio   


 


ABG pH   


 


ABG pCO2   


 


ABG pO2   


 


ABG HCO3   


 


ABG O2 Saturation   


 


ABG Base Excess   


 


FiO2   


 


Ammonia   52.1 H 


 


Lipase  9881.1 H  


 


Urine Color    YELLOW


 


Urine Appearance    CLEAR


 


Urine pH    5.0


 


Ur Specific [place]    1.011


 


Urine Protein    30 H


 


Urine Glucose (UA)    NEGATIVE


 


Urine Ketones    80 H


 


Urine Blood    LARGE H


 


Urine Nitrite    NEGATIVE


 


Ur Leukocyte Esterase    NEGATIVE


 


Urine WBC (Auto)    0


 


Urine RBC (Auto)    1














  17





  09:45


 


Carbonic Acid  0.79 L


 


HCO3/H2CO3 Ratio  13:1


 


ABG pH  7.22 L


 


ABG pCO2  26.4 L


 


ABG pO2  270.5 H


 


ABG HCO3  10.7 L


 


ABG O2 Saturation  99.5 H


 


ABG Base Excess  -15.1


 


FiO2  100%


 


Ammonia 


 


Lipase 


 


Urine Color 


 


Urine Appearance 


 


Urine pH 


 


Ur Specific Gravity 


 


Urine Protein 


 


Urine Glucose (UA) 


 


Urine Ketones 


 


Urine Blood 


 


Urine Nitrite 


 


Ur Leukocyte Esterase 


 


Urine WBC (Auto) 


 


Urine RBC (Auto) 








 











  17





  08:45 08:45


 


Creatine Kinase  766 H 


 


CK-MB (CK-2)   12.70 H


 


Troponin I   0.032











Impressions: 


 





KUB X-Ray  17 07:44


IMPRESSION:  Nasogastric tube extends below the diaphragm.  Bowel distention 

noted.


 








Chest X-Ray  17 09:47


IMPRESSION:  Endotracheal tube tip midtrachea.


Nasogastric tube tip in the distal esophagus.


Minimal retrocardiac atelectasis


 











Status: Imported from PACS





Assessment & Plan





- Diagnosis


(1) STEMI (ST elevation myocardial infarction)


Qualifiers: 


     Involved coronary artery: right coronary artery        Qualified Code(s): 

I21.11 - ST elevation (STEMI) myocardial infarction involving right coronary 

artery  


Is this a current diagnosis for this admission?: YesPlan: 


Patient has been given aspirin, Lipitor, and metoprolol.  Discussion with 

cardiology, Dr. DUQUE reveals patient has a high risk for bleeding secondary to 

his alcoholism and no unable to obtain history.  At this time does not 

recommend lytic therapy.  Patient also transfer to tertiary center for STEMI.








(2) Respiratory failure


Qualifiers: 


     Chronicity: acute     Respiratory failure complication: hypoxia     

Qualified Code(s): J96.01 - Acute respiratory failure with hypoxia  


Is this a current diagnosis for this admission?: YesPlan: 


Likely secondary to underlying STEMI.  Patient now intubated.








(3) Hypomagnesemia


Is this a current diagnosis for this admission?: YesPlan: 


Have given 3 g IV.








(4) Alcohol dependency


Qualifiers: 


     Substance use status: unspecified alcohol-induced disorder     Qualified 

Code(s): F10.29 - Alcohol dependence with unspecified alcohol-induced disorder  


Is this a current diagnosis for this admission?: YesPlan: 


Give IV thiamine folic acid replete magnesium and potassium.  Patient on Versed 

drip and propofol








(5) Hypertension


Qualifiers: 


     Hypertension type: essential hypertension        Qualified Code(s): I10 - 

Essential (primary) hypertension  


Is this a current diagnosis for this admission?: YesPlan: 


Place patient on IV metoprolol.  Patient currently preload dependent will avoid 

nitroglycerin.








(6) High anion gap metabolic acidosis


Is this a current diagnosis for this admission?: YesPlan: 


Have given patient 2 boluses of sodium bicarbonate will begin a bicarbonate 

drip at 100 mL per hour.  This is likely secondary to underlying alcohol like 

starvation ketosis, and lactic acidosis.  Patient had prior been compensated 

and a respiratory fashion and underwent subsequent respiratory failure.








(7) Elevated lipase


Is this a current diagnosis for this admission?: YesPlan: 





Concern for underlying pancreatitis.  Will hold CT abdomen and pelvis pending 

transfer.


Concern for gallstones as patient does have a mildly elevated bilirubin.








(8) Hyperammonemia


Is this a current diagnosis for this admission?: YesPlan: 


Likely secondary to underlying liver disease.











- Time


Critical Time spent with patient: 35 or more minutes - Active critical care 

time spent with this patient was 90 minutes


Medications reviewed and adjusted accordingly: Yes


Anticipated discharge: [facility]


Within: when bed available





- Inpatient Certification


Based on my medical assessment, after consideration of the patient's 

comorbidities, presenting symptoms, or acuity I expect that the services needed 

warrant INPATIENT care.: Yes


I certify that my determination is in accordance with my understanding of 

Medicare's requirements for reasonable and necessary INPATIENT services [42 CFR 

412.3e].: Yes


Medical Necessity: Need For Continuous Telemetry Monitoring


Post Hospital Care: D/C or Transfer Summary

## 2018-04-28 NOTE — EKG REPORT
[facility], [place], Emergency Department    [address] [place] [id number]    Phone:  [phone]                                       Kirby Patel   MRN: [id number]    Department:  [facility], Emergency Department   Date of Visit:  4/28/2018           After Visit Summary Signature Page     I have received my discharge instructions, and my questions have been answered. I have discussed any challenges I see with this plan with the nurse or doctor.    ..........................................................................................................................................  Patient/Patient Representative Signature      ..........................................................................................................................................  Patient Representative Print Name and Relationship to Patient    ..................................................               ................................................  Date                                            Time    ..........................................................................................................................................  Reviewed by Signature/Title    ...................................................              ..............................................  Date                                                            Time           SEVERITY:- ABNORMAL ECG -

SINUS TACHYCARDIA

BORDERLINE R WAVE PROGRESSION, ANTERIOR LEADS

NONSPECIFIC T ABNORMALITIES, INFERIOR LEADS

:

Confirmed by: Heath La 10-May-2017 13:39:02

## 2018-05-15 ENCOUNTER — HOSPITAL ENCOUNTER (OUTPATIENT)
Dept: HOSPITAL 62 - OD | Age: 58
End: 2018-05-15
Attending: RADIOLOGY
Payer: OTHER GOVERNMENT

## 2018-05-15 DIAGNOSIS — C61: Primary | ICD-10-CM

## 2018-05-15 DIAGNOSIS — R97.20: ICD-10-CM

## 2018-05-15 PROCEDURE — 84153 ASSAY OF PSA TOTAL: CPT

## 2018-05-15 PROCEDURE — 36415 COLL VENOUS BLD VENIPUNCTURE: CPT

## 2018-08-10 ENCOUNTER — HOSPITAL ENCOUNTER (OUTPATIENT)
Dept: HOSPITAL 62 - OD | Age: 58
End: 2018-08-10
Attending: RADIOLOGY
Payer: OTHER GOVERNMENT

## 2018-08-10 DIAGNOSIS — C61: Primary | ICD-10-CM

## 2018-08-10 DIAGNOSIS — R97.20: ICD-10-CM

## 2018-08-10 LAB
ADD MANUAL DIFF: NO
BASOPHILS # BLD AUTO: 0 10^3/UL (ref 0–0.2)
BASOPHILS NFR BLD AUTO: 0.4 % (ref 0–2)
EOSINOPHIL # BLD AUTO: 0.2 10^3/UL (ref 0–0.6)
EOSINOPHIL NFR BLD AUTO: 3.7 % (ref 0–6)
ERYTHROCYTE [DISTWIDTH] IN BLOOD BY AUTOMATED COUNT: 14.4 % (ref 11.5–14)
HCT VFR BLD CALC: 42.3 % (ref 37.9–51)
HGB BLD-MCNC: 14.4 G/DL (ref 13.5–17)
LYMPHOCYTES # BLD AUTO: 1.1 10^3/UL (ref 0.5–4.7)
LYMPHOCYTES NFR BLD AUTO: 18.3 % (ref 13–45)
MCH RBC QN AUTO: 27.9 PG (ref 27–33.4)
MCHC RBC AUTO-ENTMCNC: 34.1 G/DL (ref 32–36)
MCV RBC AUTO: 82 FL (ref 80–97)
MONOCYTES # BLD AUTO: 0.6 10^3/UL (ref 0.1–1.4)
MONOCYTES NFR BLD AUTO: 10.1 % (ref 3–13)
NEUTROPHILS # BLD AUTO: 4.2 10^3/UL (ref 1.7–8.2)
NEUTS SEG NFR BLD AUTO: 67.5 % (ref 42–78)
PLATELET # BLD: 218 10^3/UL (ref 150–450)
RBC # BLD AUTO: 5.17 10^6/UL (ref 4.35–5.55)
TOTAL CELLS COUNTED % (AUTO): 100 %
WBC # BLD AUTO: 6.2 10^3/UL (ref 4–10.5)

## 2018-08-10 PROCEDURE — 85025 COMPLETE CBC W/AUTO DIFF WBC: CPT

## 2018-08-10 PROCEDURE — 36415 COLL VENOUS BLD VENIPUNCTURE: CPT

## 2018-08-10 PROCEDURE — 84153 ASSAY OF PSA TOTAL: CPT

## 2018-12-13 ENCOUNTER — HOSPITAL ENCOUNTER (OUTPATIENT)
Dept: HOSPITAL 62 - OD | Age: 58
End: 2018-12-13
Attending: RADIOLOGY
Payer: OTHER GOVERNMENT

## 2018-12-13 DIAGNOSIS — R97.20: ICD-10-CM

## 2018-12-13 DIAGNOSIS — C61: Primary | ICD-10-CM

## 2018-12-13 PROCEDURE — 84153 ASSAY OF PSA TOTAL: CPT

## 2018-12-13 PROCEDURE — 36415 COLL VENOUS BLD VENIPUNCTURE: CPT

## 2019-08-24 ENCOUNTER — HOSPITAL ENCOUNTER (INPATIENT)
Dept: HOSPITAL 62 - ER | Age: 59
LOS: 6 days | Discharge: HOME HEALTH | DRG: 897 | End: 2019-08-30
Attending: INTERNAL MEDICINE | Admitting: INTERNAL MEDICINE
Payer: COMMERCIAL

## 2019-08-24 DIAGNOSIS — I42.9: ICD-10-CM

## 2019-08-24 DIAGNOSIS — E87.0: ICD-10-CM

## 2019-08-24 DIAGNOSIS — F17.200: ICD-10-CM

## 2019-08-24 DIAGNOSIS — G93.40: ICD-10-CM

## 2019-08-24 DIAGNOSIS — F43.10: ICD-10-CM

## 2019-08-24 DIAGNOSIS — N17.9: ICD-10-CM

## 2019-08-24 DIAGNOSIS — I11.0: ICD-10-CM

## 2019-08-24 DIAGNOSIS — E87.6: ICD-10-CM

## 2019-08-24 DIAGNOSIS — I50.9: ICD-10-CM

## 2019-08-24 DIAGNOSIS — Z71.41: ICD-10-CM

## 2019-08-24 DIAGNOSIS — Z82.49: ICD-10-CM

## 2019-08-24 DIAGNOSIS — E83.42: ICD-10-CM

## 2019-08-24 DIAGNOSIS — J44.9: ICD-10-CM

## 2019-08-24 DIAGNOSIS — M19.90: ICD-10-CM

## 2019-08-24 DIAGNOSIS — F10.231: Primary | ICD-10-CM

## 2019-08-24 LAB
ABSOLUTE LYMPHOCYTES# (MANUAL): 0.8 10^3/UL (ref 0.5–4.7)
ABSOLUTE MONOCYTES # (MANUAL): 1.3 10^3/UL (ref 0.1–1.4)
ADD MANUAL DIFF: YES
ALBUMIN SERPL-MCNC: 3.5 G/DL (ref 3.5–5)
ALBUMIN SERPL-MCNC: 4.1 G/DL (ref 3.5–5)
ALP SERPL-CCNC: 74 U/L (ref 38–126)
ANION GAP SERPL CALC-SCNC: 19 MMOL/L (ref 5–19)
ANION GAP SERPL CALC-SCNC: 19 MMOL/L (ref 5–19)
ANISOCYTOSIS BLD QL SMEAR: (no result)
APPEARANCE UR: CLEAR
APTT BLD: 40.7 SEC (ref 23.5–35.8)
APTT PPP: (no result) S
ARTERIAL BLOOD FIO2: (no result)
ARTERIAL BLOOD FIO2: (no result)
ARTERIAL BLOOD H2CO3: 0.92 MMOL/L (ref 1.05–1.35)
ARTERIAL BLOOD H2CO3: 1.25 MMOL/L (ref 1.05–1.35)
ARTERIAL BLOOD HCO3: 22.4 MMOL/L (ref 20–24)
ARTERIAL BLOOD HCO3: 24.6 MMOL/L (ref 20–24)
ARTERIAL BLOOD PCO2: 30.6 MMHG (ref 35–45)
ARTERIAL BLOOD PCO2: 41.4 MMHG (ref 35–45)
ARTERIAL BLOOD PH: 7.39 (ref 7.35–7.45)
ARTERIAL BLOOD PH: 7.48 (ref 7.35–7.45)
ARTERIAL BLOOD PO2: 38.4 MMHG (ref 80–100)
ARTERIAL BLOOD PO2: 95.7 MMHG (ref 80–100)
ARTERIAL BLOOD TOTAL CO2: 23.3 MMOL/L (ref 23–27)
ARTERIAL BLOOD TOTAL CO2: 25.9 MMOL/L (ref 23–27)
AST SERPL-CCNC: 48 U/L (ref 17–59)
BARBITURATES UR QL SCN: NEGATIVE
BASE EXCESS BLDA CALC-SCNC: -0.3 MMOL/L
BASE EXCESS BLDA CALC-SCNC: -0.4 MMOL/L
BASOPHILS NFR BLD MANUAL: 0 % (ref 0–2)
BILIRUB DIRECT SERPL-MCNC: 1.1 MG/DL (ref 0–0.4)
BILIRUB SERPL-MCNC: 2.1 MG/DL (ref 0.2–1.3)
BILIRUB UR QL STRIP: NEGATIVE
BUN SERPL-MCNC: 27 MG/DL (ref 7–20)
BUN SERPL-MCNC: 29 MG/DL (ref 7–20)
CALCIUM: 5.5 MG/DL (ref 8.4–10.2)
CALCIUM: 6 MG/DL (ref 8.4–10.2)
CHLORIDE SERPL-SCNC: 92 MMOL/L (ref 98–107)
CHLORIDE SERPL-SCNC: 95 MMOL/L (ref 98–107)
CO2 SERPL-SCNC: 22 MMOL/L (ref 22–30)
CO2 SERPL-SCNC: 26 MMOL/L (ref 22–30)
EOSINOPHIL NFR BLD MANUAL: 0 % (ref 0–6)
ERYTHROCYTE [DISTWIDTH] IN BLOOD BY AUTOMATED COUNT: 18 % (ref 11.5–14)
ETHANOL SERPL-MCNC: < 10 MG/DL
GLUCOSE SERPL-MCNC: 113 MG/DL (ref 75–110)
GLUCOSE SERPL-MCNC: 115 MG/DL (ref 75–110)
GLUCOSE UR STRIP-MCNC: NEGATIVE MG/DL
HCT VFR BLD CALC: 34.9 % (ref 37.9–51)
HGB BLD-MCNC: 12 G/DL (ref 13.5–17)
INR PPP: 1.4
KETONES UR STRIP-MCNC: (no result) MG/DL
MACROCYTES BLD QL SMEAR: (no result)
MCH RBC QN AUTO: 35.3 PG (ref 27–33.4)
MCHC RBC AUTO-ENTMCNC: 34.5 G/DL (ref 32–36)
MCV RBC AUTO: 102 FL (ref 80–97)
METHADONE UR QL SCN: NEGATIVE
MONOCYTES % (MANUAL): 8 % (ref 3–13)
NITRITE UR QL STRIP: NEGATIVE
PCP UR QL SCN: NEGATIVE
PH UR STRIP: 6 [PH] (ref 5–9)
PLATELET # BLD: 358 10^3/UL (ref 150–450)
PLATELET COMMENT: ADEQUATE
POTASSIUM SERPL-SCNC: 2.9 MMOL/L (ref 3.6–5)
POTASSIUM SERPL-SCNC: 3.1 MMOL/L (ref 3.6–5)
PROT SERPL-MCNC: 8 G/DL (ref 6.3–8.2)
PROT UR STRIP-MCNC: 30 MG/DL
PROTHROMBIN TIME: 17.2 SEC (ref 11.4–15.4)
RBC # BLD AUTO: 3.41 10^6/UL (ref 4.35–5.55)
SAO2 % BLDA: 72.3 % (ref 94–98)
SAO2 % BLDA: 97.8 % (ref 94–98)
SEGMENTED NEUTROPHILS % (MAN): 87 % (ref 42–78)
SP GR UR STRIP: 1.02
TOTAL CELLS COUNTED BLD: 100
URINE AMPHETAMINES SCREEN: NEGATIVE
URINE BENZODIAZEPINES SCREEN: NEGATIVE
URINE COCAINE SCREEN: NEGATIVE
URINE MARIJUANA (THC) SCREEN: NEGATIVE
UROBILINOGEN UR-MCNC: 4 MG/DL (ref ?–2)
VARIANT LYMPHS NFR BLD MANUAL: 5 % (ref 13–45)
WBC # BLD AUTO: 16.1 10^3/UL (ref 4–10.5)

## 2019-08-24 PROCEDURE — 96365 THER/PROPH/DIAG IV INF INIT: CPT

## 2019-08-24 PROCEDURE — 93005 ELECTROCARDIOGRAM TRACING: CPT

## 2019-08-24 PROCEDURE — 85610 PROTHROMBIN TIME: CPT

## 2019-08-24 PROCEDURE — 85730 THROMBOPLASTIN TIME PARTIAL: CPT

## 2019-08-24 PROCEDURE — 84132 ASSAY OF SERUM POTASSIUM: CPT

## 2019-08-24 PROCEDURE — S0164 INJECTION PANTROPRAZOLE: HCPCS

## 2019-08-24 PROCEDURE — 76705 ECHO EXAM OF ABDOMEN: CPT

## 2019-08-24 PROCEDURE — 82306 VITAMIN D 25 HYDROXY: CPT

## 2019-08-24 PROCEDURE — 82330 ASSAY OF CALCIUM: CPT

## 2019-08-24 PROCEDURE — 82962 GLUCOSE BLOOD TEST: CPT

## 2019-08-24 PROCEDURE — 83690 ASSAY OF LIPASE: CPT

## 2019-08-24 PROCEDURE — 84100 ASSAY OF PHOSPHORUS: CPT

## 2019-08-24 PROCEDURE — 82140 ASSAY OF AMMONIA: CPT

## 2019-08-24 PROCEDURE — 82803 BLOOD GASES ANY COMBINATION: CPT

## 2019-08-24 PROCEDURE — 71045 X-RAY EXAM CHEST 1 VIEW: CPT

## 2019-08-24 PROCEDURE — 82550 ASSAY OF CK (CPK): CPT

## 2019-08-24 PROCEDURE — 80202 ASSAY OF VANCOMYCIN: CPT

## 2019-08-24 PROCEDURE — 82040 ASSAY OF SERUM ALBUMIN: CPT

## 2019-08-24 PROCEDURE — 84425 ASSAY OF VITAMIN B-1: CPT

## 2019-08-24 PROCEDURE — 80307 DRUG TEST PRSMV CHEM ANLYZR: CPT

## 2019-08-24 PROCEDURE — 82150 ASSAY OF AMYLASE: CPT

## 2019-08-24 PROCEDURE — 36415 COLL VENOUS BLD VENIPUNCTURE: CPT

## 2019-08-24 PROCEDURE — 93010 ELECTROCARDIOGRAM REPORT: CPT

## 2019-08-24 PROCEDURE — 99291 CRITICAL CARE FIRST HOUR: CPT

## 2019-08-24 PROCEDURE — 87040 BLOOD CULTURE FOR BACTERIA: CPT

## 2019-08-24 PROCEDURE — 83921 ORGANIC ACID SINGLE QUANT: CPT

## 2019-08-24 PROCEDURE — 96375 TX/PRO/DX INJ NEW DRUG ADDON: CPT

## 2019-08-24 PROCEDURE — 87493 C DIFF AMPLIFIED PROBE: CPT

## 2019-08-24 PROCEDURE — 85025 COMPLETE CBC W/AUTO DIFF WBC: CPT

## 2019-08-24 PROCEDURE — 70450 CT HEAD/BRAIN W/O DYE: CPT

## 2019-08-24 PROCEDURE — 83735 ASSAY OF MAGNESIUM: CPT

## 2019-08-24 PROCEDURE — 80053 COMPREHEN METABOLIC PANEL: CPT

## 2019-08-24 PROCEDURE — 81001 URINALYSIS AUTO W/SCOPE: CPT

## 2019-08-24 PROCEDURE — 82746 ASSAY OF FOLIC ACID SERUM: CPT

## 2019-08-24 PROCEDURE — 84443 ASSAY THYROID STIM HORMONE: CPT

## 2019-08-24 PROCEDURE — 83930 ASSAY OF BLOOD OSMOLALITY: CPT

## 2019-08-24 PROCEDURE — 84484 ASSAY OF TROPONIN QUANT: CPT

## 2019-08-24 PROCEDURE — 80048 BASIC METABOLIC PNL TOTAL CA: CPT

## 2019-08-24 PROCEDURE — 83970 ASSAY OF PARATHORMONE: CPT

## 2019-08-24 RX ADMIN — POTASSIUM CHLORIDE SCH MLS/HR: 29.8 INJECTION, SOLUTION INTRAVENOUS at 23:14

## 2019-08-24 RX ADMIN — SODIUM CHLORIDE SCH MG: 234 INJECTION INTRAMUSCULAR; INTRAVENOUS; SUBCUTANEOUS at 22:57

## 2019-08-24 RX ADMIN — METOPROLOL TARTRATE SCH MG: 5 INJECTION, SOLUTION INTRAVENOUS at 21:20

## 2019-08-24 RX ADMIN — HEPARIN SODIUM SCH UNIT: 5000 INJECTION, SOLUTION INTRAVENOUS; SUBCUTANEOUS at 21:21

## 2019-08-24 RX ADMIN — SODIUM CHLORIDE PRN MLS/HR: 9 INJECTION, SOLUTION INTRAVENOUS at 18:09

## 2019-08-24 RX ADMIN — POTASSIUM CHLORIDE SCH MLS/HR: 29.8 INJECTION, SOLUTION INTRAVENOUS at 21:25

## 2019-08-24 RX ADMIN — SODIUM CHLORIDE SCH MG: 234 INJECTION INTRAMUSCULAR; INTRAVENOUS; SUBCUTANEOUS at 23:12

## 2019-08-24 RX ADMIN — MONTELUKAST SODIUM SCH: 10 TABLET, FILM COATED ORAL at 21:25

## 2019-08-24 RX ADMIN — Medication SCH: at 22:23

## 2019-08-24 NOTE — RADIOLOGY REPORT (SQ)
EXAM DESCRIPTION:  CHEST SINGLE VIEW



COMPLETED DATE/TIME:  8/24/2019 5:46 pm



REASON FOR STUDY:  hypomagnesium



COMPARISON:  Chest x-ray 5/9/2017.



EXAM PARAMETERS:  NUMBER OF VIEWS: One view.

TECHNIQUE:  2 frontal radiographic views of the chest acquired.

RADIATION DOSE: NA

LIMITATIONS: None.



FINDINGS:  LUNGS AND PLEURA: There is elevation of the right hemidiaphragm.  Airspace opacities are n
oted at the bilateral lung bases.  No sizable pleural effusion or pneumothorax.

MEDIASTINUM AND HILAR STRUCTURES: No masses.  Contour normal.

HEART AND VASCULAR STRUCTURES: Heart normal in size.  No overt vascular congestion.

BONES: No acute findings.

HARDWARE: Orthopedic hardware noted at the lower cervical spine.



IMPRESSION:  Bibasilar airspace opacities, may be secondary to atelectasis or pneumonia.



TECHNICAL DOCUMENTATION:  JOB ID:  [id number]

[id number]

 2011 [employer]- All Rights Reserved



Reading location - IP/workstation name: [email or web address]

## 2019-08-24 NOTE — EKG REPORT
SEVERITY:- ABNORMAL ECG -

SINUS TACHYCARDIA

NONSPECIFIC T ABNORMALITIES, DIFFUSE LEADS

:

Confirmed by: Virginia Cochran MD 24-Aug-2019 19:05:09

## 2019-08-24 NOTE — PDOC H&P
History of Present Illness


Admission Date/PCP: 


  19 16:36





  


2019


History of Present Illness: 


KRISTA CASTRO is a 59 year old male


Mr. Castro is a 59-year-old black male who comes in with altered mental status, 

confusion 5 days.  Cording to the wife the patient charted having these problems

on Monday, Tuesday he went to his primary care doctor and had lab work drawn.  

On Wednesday his primary care provider called and said that he need to start 

taking potassium that his potassium was low into the medications I have reviewed

he was 20 mEq daily.  He states that in the last day or 2 his confusion is 

gotten worse, also he started having tremors and shaking all over.  Cording to 

the wife and the daughter this in the room he is never had this type of behavior

before.





Patient has a long history of alcohol abuse.  The patient is able to answer me 

and states that he drinks 2 fifths of Gin gin per day, and has done so for a 

number of years.


Approximately 1 year ago the patient was 2 months then for 2 months when he got 

out of rehab he was sober.  After that however he started drinking heavily 

again.





Other past medical history includes hypertension.  Patient and family deny di

abetes previous MI previous CVA





Past Medical History


Cardiac Medical History: Reports: Congestive Heart Failure - Cardiomyopathy, 

Hypertension


   Denies: Coronary Artery Disease, Myocardial Infarction


Pulmonary Medical History: Reports: Chronic Obstructive Pulmonary Disease (COPD)


   Denies: Asthma, Bronchitis, Pneumonia


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


Endocrine Medical History: Reports: None


Renal/ Medical History: Reports: None


Malignancy Medical History: Reports: None


GI Medical History: Reports: None


Musculoskeltal Medical History: Reports: Arthritis


Psychiatric Medical History: Reports: Alcohol Dependency, Post Traumatic Stress 

Disorder


   Denies: Depression


Hematology: 


   Denies: Anemia





Past Surgical History


Past Surgical History: Reports: Orthopedic Surgery - neck





Social History


Lives with: Spouse/Significant other


Smoking Status: Current Every Day Smoker


Frequency of Alcohol Use: Heavy


Hx Recreational Drug Use: No


Drugs: None


Hx Prescription Drug Abuse: No





- Advance Directive


Resuscitation Status: Full Code





Family History


Family History: Reviewed & Not Pertinent, Hypertension, Other - Mom  age

74 Alzheimer's


Parental Family History Reviewed: No


Children Family History Reviewed: No


Sibling(s) Family History Reviewed.: No





Medication/Allergy


Home Medications: 








Unobtainable  17 








Allergies/Adverse Reactions: 


                                        





No Known Allergies Allergy (Verified 17 05:01)


   











Review of Systems


All systems: reviewed and no additional remarkable complaints except as stated


Constitutional: ABSENT: chills, fever(s), headache(s), weight gain, weight loss


Eyes: ABSENT: visual disturbances


Cardiovascular: ABSENT: chest pain, dyspnea on exertion, edema, orthropnea, 

palpitations


Respiratory: ABSENT: cough, hemoptysis


Gastrointestinal: ABSENT: abdominal pain, constipation, diarrhea, hematemesis, 

hematochezia, nausea, vomiting


Neurological: ABSENT: abnormal gait, abnormal speech, confusion, dizziness, 

focal weakness, syncope


Psychiatric: ABSENT: anxiety, depression, homidical ideation, suicidal ideation





Physical Exam


Vital Signs: 


                                        











Temp Pulse Resp BP Pulse Ox


 


 98.2 F      34 H  127/87 H  96 


 


 19 17:00     19 17:00  19 15:01  19 17:00








                                 Intake & Output











 19





 06:59 06:59 06:59


 


Intake Total   251.2


 


Balance   251.2


 


Weight   95.254 kg











General appearance: PRESENT: cooperative, severe distress


Head exam: PRESENT: atraumatic, normocephalic


Respiratory exam: PRESENT: clear to auscultation dax.  ABSENT: rales, rhonchi, 

wheezes


Cardiovascular exam: PRESENT: RRR.  ABSENT: diastolic murmur, rubs, systolic 

murmur


GI/Abdominal exam: PRESENT: normal bowel sounds, soft.  ABSENT: distended, 

guarding, mass, organolmegaly, rebound, tenderness


Musculoskeletal exam: PRESENT: other - Patient has bilateral carpal spasms.  

Patient has what appears to be involuntary tremors 4 extremities, with upper 

worse in the lower


Neurological exam: PRESENT: alert, altered, awake, oriented to person, oriented 

to place, other - Patient is able to  with both hands on command.  Patient 

has what appears to be involuntary tremors worse in both upper extremities than 

lower, patient is able to move all 4 extremities against gravity


Psychiatric exam: PRESENT: unusual affect, other - Does not open his eyes while 

I was in the room but he does answer questions, he does follow commands,





Results


Laboratory Results: 


                                        





                                 19 14:52 





                                 19 15:25 





                                        











  08/19





  14:52 14:52 15:25


 


WBC  16.1 H  


 


RBC  3.41 L  


 


Hgb  12.0 L  


 


Hct  34.9 L  


 


MCV  102 H  


 


MCH  35.3 H  


 


MCHC  34.5  


 


RDW  18.0 H  


 


Plt Count  358  


 


Seg Neutrophils %  Not Reportable  


 


Carbonic Acid   


 


HCO3/H2CO3 Ratio   


 


ABG pH   


 


ABG pCO2   


 


ABG pO2   


 


ABG HCO3   


 


ABG O2 Saturation   


 


ABG Base Excess   


 


FiO2   


 


Sodium   Cancelled  137.0


 


Potassium   Cancelled  3.1 L


 


Chloride   Cancelled  92 L


 


Carbon Dioxide   Cancelled  26


 


Anion Gap   Cancelled  19


 


BUN   Cancelled  29 H


 


Creatinine   Cancelled  1.77 H


 


Est GFR ( Amer)   Cancelled  48 L


 


Est GFR (Non-Af Amer)   Cancelled 


 


Glucose   Cancelled  113 H


 


Calcium   Cancelled  5.5 L*


 


Ionized Calcium Nnamdi   


 


Phosphorus   


 


Magnesium   Cancelled  0.4 L*


 


Total Bilirubin   Cancelled  2.1 H


 


AST   Cancelled  48


 


Alkaline Phosphatase   Cancelled  74


 


Total Protein   Cancelled  8.0


 


Albumin   Cancelled  4.1














  19





  15:25 17:08 17:08


 


WBC   


 


RBC   


 


Hgb   


 


Hct   


 


MCV   


 


MCH   


 


MCHC   


 


RDW   


 


Plt Count   


 


Seg Neutrophils %   


 


Carbonic Acid    1.25


 


HCO3/H2CO3 Ratio    19:1


 


ABG pH    7.39


 


ABG pCO2    41.4


 


ABG pO2    38.4 L*


 


ABG HCO3    24.6 H


 


ABG O2 Saturation    72.3 L


 


ABG Base Excess    -0.3


 


FiO2    2L


 


Sodium   


 


Potassium   


 


Chloride   


 


Carbon Dioxide   


 


Anion Gap   


 


BUN   


 


Creatinine   


 


Est GFR ( Amer)   


 


Est GFR (Non-Af Amer)   


 


Glucose   


 


Calcium   


 


Ionized Calcium Nnamdi   0.73 L 


 


Phosphorus  6.0 H  


 


Magnesium   


 


Total Bilirubin   


 


AST   


 


Alkaline Phosphatase   


 


Total Protein   


 


Albumin   














Assessment and Plan





- Diagnosis


(1) Alcohol abuse


Is this a current diagnosis for this admission?: Yes   


Plan: 


Patient and his family state that he last had his normal consumption of alcohol 

2 days ago, which is approximately 2/5 of gin per day.  His wife states that 

this morning he just had a couple of drinks.  He has been an alcoholic for many 

years and is been in rehab in the past.








(2) Hypocalcemia


Is this a current diagnosis for this admission?: Yes   


Plan: 


Patient's calcium level in the ER is 5.5 and are normal range here is from 8.4-

10.2.  Patient's albumin level is normal








(3) Hypomagnesemia


Is this a current diagnosis for this admission?: Yes   


Plan: 


Patient's magnesium level is 0.4 with our normal range being 1.6-2.3








(4) Tremor


Is this a current diagnosis for this admission?: Yes   


Plan: 


She has what appears to be involuntary tremors of all 4 extremities, with the 

upper worse in the lower patient also has what appears to be carpal spasms of 

both hands








(5) Hypertension


Qualifiers: 


   Hypertension type: essential hypertension   Qualified Code(s): I10 - 

Essential (primary) hypertension   


Is this a current diagnosis for this admission?: Yes   


Plan: 


Patient is taking medication for hypertension Coreg 3.125 , Lasix 40 mg a day, 

Norvasc 5mg








- Time


Time Spent with patient: 25-34 minutes

## 2019-08-24 NOTE — ER DOCUMENT REPORT
ED General





- General


TRAVEL OUTSIDE OF THE [place] IN LAST 30 DAYS: No





<CHATO WRIGHT - Last Filed: 19 18:15>





<IVELISSE GALVAN - Last Filed: 19 07:34>





- General


Chief Complaint: Altered Mental Status


Stated Complaint: AMS





- HPI


Notes: 





59-year-old male to the emergency department with complaints of altered mental 

status and tremors.  Patient's wife called medics today when she thought that he

was acutely confused and noticed that he had worsening tremors to his upper 

extremity.  Patient admits that he drinks alcohol daily typically at fifth of 

gin.  He has not had this much to drink and has actually significantly had less 

today.  He states that he feels like his body is stiffening up on him and 

spasming.  He states that he has a history of low potassium and he thinks that 

that might be the case.  He states that he has been drinking heavy amounts of 

alcohol for several years.  He does admit that at one time he tried to quit.  

When he did try to quit he had a seizure.  He denies any chest pain, shortness 

of breath, nausea vomiting or diarrhea, headache.  (CHATO WRIGHT)





- Related Data


Allergies/Adverse Reactions: 


                                        





No Known Allergies Allergy (Verified 17 05:01)


   











Past Medical History





- General


Information source: Patient, Emergency Med Personnel





- Social History


Smoking Status: Current Every Day Smoker


Chew tobacco use (# tins/day): No


Frequency of alcohol use: Heavy


Drug Abuse: None


Lives with: Spouse/Significant other


Family History: Reviewed & Not Pertinent, Hypertension, Other - Mom  age

74 Alzheimer's


Patient has suicidal ideation: No


Patient has homicidal ideation: No





- Past Medical History


Cardiac Medical History: Reports: Hx Congestive Heart Failure - Cardiomyopathy, 

Hx Hypertension


   Denies: Hx Coronary Artery Disease, Hx Heart Attack


Pulmonary Medical History: Reports: Hx COPD


   Denies: Hx Asthma, Hx Bronchitis, Hx Pneumonia


Neurological Medical History: Reports: Hx Seizures - Secondary to alcohol.  

Denies: Hx Cerebrovascular Accident


Renal/ Medical History: Denies: Hx Peritoneal Dialysis


Musculoskeletal Medical History: Reports Hx Arthritis


Psychiatric Medical History: Reports: Hx Post Traumatic Stress Disorder


   Denies: Hx Depression


Past Surgical History: Reports: Hx Orthopedic Surgery - neck





- Immunizations


Hx Diphtheria, Pertussis, Tetanus Vaccination: No





<CHATO WRIGHT - Last Filed: 19 18:15>





Review of Systems





- Review of Systems


Constitutional: denies: Chills, Fever


EENT: No symptoms reported


Cardiovascular: denies: Chest pain, Palpitations, Dyspnea, Syncope, Dizziness, 

Lightheaded


Respiratory: denies: Cough, Short of breath


Gastrointestinal: denies: Abdominal pain, Diarrhea, Nausea, Vomiting


Musculoskeletal: See HPI


Skin: No symptoms reported


Neurological/Psychological: See HPI, Confusion, Tremor.  denies: Lost 

consciousness, Headaches, Numbness


-: Yes All other systems reviewed and negative





<CHATO WRIGHT - Last Filed: 19 18:15>





Physical Exam





- Vital signs


Interpretation: Tachycardic - Patient tachycardic at 118, Tachypneic





- General


General appearance: Alert





- HEENT


Head: Normocephalic, Atraumatic


Eyes: Normal


Pupils: PERRL


Ears: Normal


External canal: Normal


Tympanic membrane: Normal


Sinus: Normal


Nasal: Normal


Mouth/Lips: Normal


Mucous membranes: Normal


Pharynx: Normal


Neck: Normal





- Respiratory


Respiratory status: Tachypnea


Chest status: Nontender


Breath sounds: Normal.  No: Decreased air movement, Rales, Rhonchi, Stridor, 

Wheezing


Chest palpation: Normal





- Cardiovascular


Rhythm: Tachycardia


Heart sounds: Normal auscultation, S1 appreciated, S2 appreciated


Murmur: No





- Abdominal


Inspection: Normal


Distension: No distension.  No: Distended, Tympanitic, Fluid wave, Distended 

bladder


Bowel sounds: Normal


Tenderness: Nontender.  No: McBurney's point, Garza's sign, Guarding, Rebound


Organomegaly: No organomegaly





- Back


Back: Normal, Nontender.  No: CVA tenderness





- Neurological


Cognition: Confused


Orientation: Disoriented to time - Patient thinks it is 2020.  He is oriented to

person and place.  He is aware of who the president of [facility] is.


Steger Coma Scale Eye Opening: Spontaneous


Omero Coma Scale Verbal: Oriented


Steger Coma Scale Motor: Obeys Commands


Steger Coma Scale Total: 15


Speech: Normal


Cranial nerves: Normal.  No: Facial palsy, Forehead sparing, Gaze palsy, Tongue 

deviation


Motor strength normal: LUE, RUE, LLE, RLE


Additional motor exam normals: Equal , Pronator drift





- Psychological


Associated symptoms: Normal affect, Normal mood





- Skin


Skin Temperature: Warm


Skin Moisture: Dry


Skin Color: Normal





<TIANNA WRIGHTBETH MONO - Last Filed: 19 18:15>





- Vital signs


Vitals: 


                                        











Resp BP Pulse Ox


 


 31 H  129/87 H  95 


 


 19 14:43  19 14:43  19 14:43














- General


Notes: 





Patient is nontoxic in appearance.  He is tremulous to bilateral hands on exam. 

(CHATO WRIGHT)





- Cardiovascular


Notes: 





No leg edema (CHATO WRIGHT)





- Neurological


Notes: 





Noted bilateral tremors to the hands and also to the legs.  Patient attempts to 

hold his hands and fist to decrease the overall intensity of the tremors. There 

is no asterixis.  (CHATO WRIGHT)





Course





- Laboratory


Result Diagrams: 


                                 19 14:52





                                 19 15:25





- Diagnostic Test


Radiology reviewed: Image reviewed, Reports reviewed





- EKG Interpretation by Me


Rate: Tachycardia





<WRIGHTCHATO AZUL - Last Filed: 19 18:15>





- Laboratory


Result Diagrams: 


                                 19 05:59





                                 19 05:59





<IVELISSE GALVAN - Last Filed: 19 07:34>





- Re-evaluation


Re-evalutation: 





19 1


Discussed patient with Dr. Galvan, ER attending.  We agreed that patient should 

have 4 g of magnesium and 3g of calcium.  We agreed that patient should be 

entered to the hospitalist service.  Will add on phosphorus, PTH, TSH, ABG and 

page hospitalist service.  Rounded on patient and his family.  Patient is more 

confused than when he first came in.  Attempted to give oral potassium but 

patient could not tolerate.  Discussed patient with CODY jimenez, hospitalist PA.

 He agrees with the plan for admission.  Initially he requests a telemetry bed 

but after seeing patient he asked for ICU.  He is aware of the severe 

electrolyte abnormalities as well as the pending lab work.  He is aware that 4 g

of magnesium and 3 g of calcium have been ordered and that I will order a run of

potassium as well.





Impression:  Significant hypomagnesia, hypocalcemia.  Tremors, alcohol 

withdrawal.  Patient to be admitted to the ICU.  Dr. Galvan, Er attending, aware 

and agrees with the plan.   Patient and family agree with the plan.  





19 18:13


Noted ABG initially.  It really does not correspond appropriately with patient. 

Discussed with Dr. Galvan further.  Will obtain redraw. 


19 18:15


Noted repeat ABG which is much more consistent with patient's presentation.  

Rounded on family and patient.  He is able to talk to me, but he is more 

confused than when I first evaluated him. He appears to be having delusions and 

no longer is oriented to place.  Discussed patient with Dr. Galvan further. He 

went and saw patient.     (CHATO WRIGHT)





I personally and independently obtained patient history and examined the patient

in conjunction with the APC and agree with the assessment, treatment plan and 

disposition of the patient as recorded by the APC, and have reviewed the APC's 

note.





HISTORY OF PRESENT ILLNESS:


Patient is a 59-year-old male with history of alcoholism that presents to the 

emergency department for chief complaint of altered mental status and 

generalized weakness.








PHYSICAL EXAMINATION:





Vital signs reviewed, nursing noted reviewed. 





GENERAL: Patient is altered, tremulous, and appears confused





HEAD: Atraumatic, normocephalic.





EYES: Eyes appear normal, conjunctiva are normal.





ENT: nares patent, oropharynx clear without exudates.  Moist mucous membranes.





NECK: Normal range of motion, supple without lymphadenopathy





LUNGS: Breath sounds clear to auscultation bilaterally and equal.  No wheezes 

rales or rhonchi.





HEART: Heart rate tachycardic, regular rhythm, no audible murmur





ABDOMEN: Soft, nontender, normoactive bowel sounds.  No rebound, guarding, or 

rigidity. No masses appreciated.





EXTREMITIES: Nontender, good range of motion, no pitting or edema.  





NEUROLOGICAL: Intention tremor noted bilaterally in the upper extremities, no 

focal neurological deficits. Moves all extremities spontaneously Motor and 

sensory grossly intact on exam.





PSYCH: Patient does appear confused on exam, not answering all questions 

appropriately.





SKIN: Warm, Dry, normal turgor, no rashes or lesions noted on exposed skin





MEDICAL DECISION MAKING:





Patient seen and examined, vital signs reviewed, patient did not appear well on 

exam, he had multiple electrolyte abnormalities on his blood work, likely 

consistent with the patient's alcoholism, but is very high risk for possible 

cardiac dysrhythmia, given severe hypo-Dunn anemia, patient need to be 

admitted for treatment, and monitoring, in addition to treatment for alcohol 

withdrawal, and these electrolyte abnormalities.





Please review detail APC documentation. 





*Note is created using voice recognition software and may contain spelling, 

syntax or grammatical errors.  





 (IVELISSE GALVAN)





- Vital Signs


Vital signs: 


                                        











Temp Pulse Resp BP Pulse Ox


 


 98.9 F   106 H  44 H  145/107 H  98 


 


 19 17:59  19 20:00  19 06:00  19 05:15  19 06:00














 (CHATO WRIGHT)





- Laboratory


Laboratory results interpreted by me: 





                                        











  19





  14:52 14:58 15:25


 


WBC  16.1 H  


 


RBC  3.41 L  


 


Hgb  12.0 L  


 


Hct  34.9 L  


 


MCV  102 H  


 


MCH  35.3 H  


 


RDW  18.0 H  


 


Seg Neuts % (Manual)  87 H  


 


Lymphocytes % (Manual)  5 L  


 


Abs Neuts (Manual)  14.0 H  


 


Potassium    3.1 L


 


Chloride    92 L


 


BUN    29 H


 


Creatinine    1.77 H


 


Est GFR ( Amer)    48 L


 


Est GFR (MDRD) Non-Af    40 L


 


Glucose    113 H


 


POC Glucose   120 H 


 


Calcium    5.5 L*


 


Phosphorus   


 


Magnesium    0.4 L*


 


Total Bilirubin    2.1 H


 


Direct Bilirubin    1.1 H


 


Creatine Kinase   














  19





  15:25 15:25


 


WBC  


 


RBC  


 


Hgb  


 


Hct  


 


MCV  


 


MCH  


 


RDW  


 


Seg Neuts % (Manual)  


 


Lymphocytes % (Manual)  


 


Abs Neuts (Manual)  


 


Potassium  


 


Chloride  


 


BUN  


 


Creatinine  


 


Est GFR ( Amer)  


 


Est GFR (MDRD) Non-Af  


 


Glucose  


 


POC Glucose  


 


Calcium  


 


Phosphorus  6.0 H 


 


Magnesium  


 


Total Bilirubin  


 


Direct Bilirubin  


 


Creatine Kinase   948 H














- EKG Interpretation by Me


Additional EKG results interpreted by me: 





19 


No STEMI, rate of 114.  Sinus tachycardia.  There is diffuse T wave 

abnormalities throughout.  QT C is 436 (CHATO WRIGHT)





Critical Care Note





- Critical Care Note


Total time excluding time spent on procedures (mins): 45





<CHATO WRIGHT - Last Filed: 19 18:15>





- Critical Care Note


Comments: 





Critical Care was provided on this patient.  Patient with significant and life 

threatening electrolyte abnormalities.    45 minutes was spent with 

consultations with ER attending, Hospitalist team, and bedside with family.   

(CHATO WRIGHT)





Discharge





- Discharge


Admitting Provider: Dayana (Hospitalist)


Unit Admitted: ICU





<CHATO WRIGHT - Last Filed: 19 18:15>





<IVELISSE GALVAN - Last Filed: 19 07:34>





- Discharge


Clinical Impression: 


 Hypomagnesemia, Hypokalemia, Hypocalcemia, Tremor, Alcohol abuse, Alcohol 

withdrawal delirium





Condition: Stable


Disposition: ADMITTED AS INPATIENT

## 2019-08-24 NOTE — PROGRESS NOTE
Provider Note


Provider Note: 





I did explain to the wife and daughter that this could be a serious life-

threatening illness, concerning cardiac arrhythmias secondary to the electrolyte

imbalance she expressed her desire for him to be a full code





Patient has been given magnesium replacement as well as calcium replacement in 

the ED, EKG is pending and currently not done.  Patient appears to be stable now

for transfer to the ICU

## 2019-08-25 LAB
ABSOLUTE LYMPHOCYTES# (MANUAL): 0.7 10^3/UL (ref 0.5–4.7)
ABSOLUTE MONOCYTES # (MANUAL): 1 10^3/UL (ref 0.1–1.4)
ADD MANUAL DIFF: YES
ALBUMIN SERPL-MCNC: 3.1 G/DL (ref 3.5–5)
ALP SERPL-CCNC: 77 U/L (ref 38–126)
AMYLASE SERPL-CCNC: < 30 U/L (ref 30–110)
ANION GAP SERPL CALC-SCNC: 16 MMOL/L (ref 5–19)
ANISOCYTOSIS BLD QL SMEAR: (no result)
AST SERPL-CCNC: 156 U/L (ref 17–59)
BASOPHILS NFR BLD MANUAL: 0 % (ref 0–2)
BILIRUB DIRECT SERPL-MCNC: 1.3 MG/DL (ref 0–0.4)
BILIRUB SERPL-MCNC: 2.1 MG/DL (ref 0.2–1.3)
BUN SERPL-MCNC: 22 MG/DL (ref 7–20)
CALCIUM: 7.7 MG/DL (ref 8.4–10.2)
CHLORIDE SERPL-SCNC: 102 MMOL/L (ref 98–107)
CO2 SERPL-SCNC: 21 MMOL/L (ref 22–30)
EOSINOPHIL NFR BLD MANUAL: 0 % (ref 0–6)
ERYTHROCYTE [DISTWIDTH] IN BLOOD BY AUTOMATED COUNT: 17.9 % (ref 11.5–14)
GLUCOSE SERPL-MCNC: 112 MG/DL (ref 75–110)
HCT VFR BLD CALC: 31.4 % (ref 37.9–51)
HGB BLD-MCNC: 10.6 G/DL (ref 13.5–17)
MACROCYTES BLD QL SMEAR: (no result)
MCH RBC QN AUTO: 34.7 PG (ref 27–33.4)
MCHC RBC AUTO-ENTMCNC: 33.8 G/DL (ref 32–36)
MCV RBC AUTO: 103 FL (ref 80–97)
MONOCYTES % (MANUAL): 8 % (ref 3–13)
NEUTS BAND NFR BLD MANUAL: 1 % (ref 3–5)
PHOSPHATE SERPL-MCNC: 3.3 MG/DL (ref 2.5–4.5)
PHOSPHATE SERPL-MCNC: 5.9 MG/DL (ref 2.5–4.5)
PLATELET # BLD: 250 10^3/UL (ref 150–450)
PLATELET COMMENT: ADEQUATE
POTASSIUM SERPL-SCNC: 3 MMOL/L (ref 3.6–5)
POTASSIUM SERPL-SCNC: 3.8 MMOL/L (ref 3.6–5)
PROT SERPL-MCNC: 6.2 G/DL (ref 6.3–8.2)
RBC # BLD AUTO: 3.06 10^6/UL (ref 4.35–5.55)
SEGMENTED NEUTROPHILS % (MAN): 85 % (ref 42–78)
TOTAL CELLS COUNTED BLD: 100
VARIANT LYMPHS NFR BLD MANUAL: 6 % (ref 13–45)
WBC # BLD AUTO: 12.2 10^3/UL (ref 4–10.5)

## 2019-08-25 PROCEDURE — HZ2ZZZZ DETOXIFICATION SERVICES FOR SUBSTANCE ABUSE TREATMENT: ICD-10-PCS | Performed by: PHYSICIAN ASSISTANT

## 2019-08-25 RX ADMIN — PANTOPRAZOLE SODIUM SCH MG: 40 INJECTION, POWDER, FOR SOLUTION INTRAVENOUS at 09:22

## 2019-08-25 RX ADMIN — POTASSIUM CHLORIDE SCH MLS/HR: 29.8 INJECTION, SOLUTION INTRAVENOUS at 11:14

## 2019-08-25 RX ADMIN — LORAZEPAM PRN MG: 2 INJECTION INTRAMUSCULAR; INTRAVENOUS at 17:41

## 2019-08-25 RX ADMIN — SODIUM CHLORIDE SCH MG: 234 INJECTION INTRAMUSCULAR; INTRAVENOUS; SUBCUTANEOUS at 23:10

## 2019-08-25 RX ADMIN — DIAZEPAM PRN MG: 5 INJECTION, SOLUTION INTRAMUSCULAR; INTRAVENOUS at 21:58

## 2019-08-25 RX ADMIN — LORAZEPAM PRN MG: 2 INJECTION INTRAMUSCULAR; INTRAVENOUS at 20:05

## 2019-08-25 RX ADMIN — LORAZEPAM PRN MG: 2 INJECTION INTRAMUSCULAR; INTRAVENOUS at 11:13

## 2019-08-25 RX ADMIN — SODIUM CHLORIDE PRN MLS/HR: 9 INJECTION, SOLUTION INTRAVENOUS at 01:22

## 2019-08-25 RX ADMIN — HEPARIN SODIUM SCH UNIT: 5000 INJECTION, SOLUTION INTRAVENOUS; SUBCUTANEOUS at 21:58

## 2019-08-25 RX ADMIN — Medication SCH: at 13:24

## 2019-08-25 RX ADMIN — SODIUM CHLORIDE SCH MG: 234 INJECTION INTRAMUSCULAR; INTRAVENOUS; SUBCUTANEOUS at 01:51

## 2019-08-25 RX ADMIN — SODIUM CHLORIDE SCH MG: 234 INJECTION INTRAMUSCULAR; INTRAVENOUS; SUBCUTANEOUS at 09:22

## 2019-08-25 RX ADMIN — Medication SCH: at 21:59

## 2019-08-25 RX ADMIN — METOPROLOL TARTRATE SCH MG: 5 INJECTION, SOLUTION INTRAVENOUS at 13:23

## 2019-08-25 RX ADMIN — METOPROLOL TARTRATE SCH MG: 5 INJECTION, SOLUTION INTRAVENOUS at 09:12

## 2019-08-25 RX ADMIN — DIAZEPAM PRN MG: 5 INJECTION, SOLUTION INTRAMUSCULAR; INTRAVENOUS at 13:24

## 2019-08-25 RX ADMIN — POTASSIUM CHLORIDE AND SODIUM CHLORIDE PRN MLS/HR: 900; 300 INJECTION, SOLUTION INTRAVENOUS at 15:57

## 2019-08-25 RX ADMIN — HEPARIN SODIUM SCH UNIT: 5000 INJECTION, SOLUTION INTRAVENOUS; SUBCUTANEOUS at 13:23

## 2019-08-25 RX ADMIN — DIAZEPAM PRN MG: 5 INJECTION, SOLUTION INTRAMUSCULAR; INTRAVENOUS at 17:41

## 2019-08-25 RX ADMIN — SODIUM CHLORIDE SCH MG: 234 INJECTION INTRAMUSCULAR; INTRAVENOUS; SUBCUTANEOUS at 17:15

## 2019-08-25 RX ADMIN — SODIUM CHLORIDE PRN MLS/HR: 9 INJECTION, SOLUTION INTRAVENOUS at 05:53

## 2019-08-25 RX ADMIN — POTASSIUM CHLORIDE AND SODIUM CHLORIDE PRN MLS/HR: 900; 300 INJECTION, SOLUTION INTRAVENOUS at 09:05

## 2019-08-25 RX ADMIN — METOPROLOL TARTRATE SCH MG: 5 INJECTION, SOLUTION INTRAVENOUS at 01:22

## 2019-08-25 RX ADMIN — POTASSIUM CHLORIDE SCH MLS/HR: 29.8 INJECTION, SOLUTION INTRAVENOUS at 01:22

## 2019-08-25 RX ADMIN — LORAZEPAM PRN MG: 2 INJECTION INTRAMUSCULAR; INTRAVENOUS at 21:59

## 2019-08-25 RX ADMIN — POTASSIUM CHLORIDE SCH MLS/HR: 29.8 INJECTION, SOLUTION INTRAVENOUS at 11:15

## 2019-08-25 RX ADMIN — SODIUM CHLORIDE SCH MG: 234 INJECTION INTRAMUSCULAR; INTRAVENOUS; SUBCUTANEOUS at 23:30

## 2019-08-25 RX ADMIN — POTASSIUM CHLORIDE SCH MLS/HR: 29.8 INJECTION, SOLUTION INTRAVENOUS at 13:23

## 2019-08-25 RX ADMIN — METOCLOPRAMIDE SCH MLS/HR: 5 INJECTION, SOLUTION INTRAMUSCULAR; INTRAVENOUS at 17:10

## 2019-08-25 RX ADMIN — HEPARIN SODIUM SCH UNIT: 5000 INJECTION, SOLUTION INTRAVENOUS; SUBCUTANEOUS at 05:52

## 2019-08-25 RX ADMIN — METOPROLOL TARTRATE SCH MG: 5 INJECTION, SOLUTION INTRAVENOUS at 17:41

## 2019-08-25 RX ADMIN — METOPROLOL TARTRATE SCH MG: 5 INJECTION, SOLUTION INTRAVENOUS at 21:59

## 2019-08-25 RX ADMIN — METOPROLOL TARTRATE SCH MG: 5 INJECTION, SOLUTION INTRAVENOUS at 05:52

## 2019-08-25 RX ADMIN — SODIUM CHLORIDE SCH MG: 234 INJECTION INTRAMUSCULAR; INTRAVENOUS; SUBCUTANEOUS at 17:31

## 2019-08-25 RX ADMIN — MONTELUKAST SODIUM SCH MG: 10 TABLET, FILM COATED ORAL at 21:59

## 2019-08-25 RX ADMIN — Medication SCH ML: at 05:52

## 2019-08-25 RX ADMIN — DIAZEPAM PRN MG: 5 INJECTION, SOLUTION INTRAMUSCULAR; INTRAVENOUS at 09:22

## 2019-08-25 RX ADMIN — SODIUM CHLORIDE SCH MG: 234 INJECTION INTRAMUSCULAR; INTRAVENOUS; SUBCUTANEOUS at 09:12

## 2019-08-25 RX ADMIN — LORAZEPAM PRN MG: 2 INJECTION INTRAMUSCULAR; INTRAVENOUS at 13:26

## 2019-08-25 NOTE — EKG REPORT
SEVERITY:- ABNORMAL ECG -

SINUS TACHYCARDIA [Remains]

VENTRICULAR PREMATURE COMPLEX [Now Present]

BORDERLINE LEFT AXIS DEVIATION [Now Present]

BORDERLINE R WAVE PROGRESSION, ANTERIOR LEADS [Insig. Chg.]

BORDERLINE T WAVE ABNORMALITIES [Remains]

BORDERLINE PROLONGED QT INTERVAL [Now Present]

SIGNIFICANT ECG CONTOUR CHANGES

:

Confirmed by: Virginia Cochran MD 25-Aug-2019 09:29:36

## 2019-08-25 NOTE — PROGRESS NOTE ACKNOWLEDGEMENT
Progress Note Acknowledgement


Progess Note Acknowledgement: 


I, the undersigned member of the medical staff with appropriate privileges and 

with supervisory authority over [Luciano Williamson], a dependent practice allied 

health professional, acknowledge that I have reviewed the progress notes entered

on this patient, and in my professional judgment believe that the assessment 

made and/or any care evidenced was appropriate

## 2019-08-25 NOTE — PDOC PROGRESS REPORT
Subjective


Progress Note for:: 08/25/19


Subjective:: 





August 25, 2019-no complaints at this time.  Patient with visible shakes not 

sure if this is beginning of withdrawal or from his tetany


Reason For Visit: 


ALCOHOL ABUSE, HYPOMAGNESIUM,HYPOCALCEMIA,HYPERTEN








Physical Exam


Vital Signs: 


                                        











Temp Pulse Resp BP Pulse Ox


 


 98.4 F   99   17   127/95 H  96 


 


 08/25/19 08:00  08/25/19 08:00  08/25/19 08:00  08/25/19 08:00  08/25/19 08:00








                                 Intake & Output











 08/24/19 08/25/19 08/26/19





 06:59 06:59 06:59


 


Intake Total  3324.2 


 


Output Total  1650 305


 


Balance  1674.2 -305


 


Weight  92 kg 











General appearance: PRESENT: no acute distress, well-developed, well-nourished


Neck exam: ABSENT: carotid bruit, JVD, lymphadenopathy, thyromegaly


Respiratory exam: PRESENT: clear to auscultation dax.  ABSENT: rales, rhonchi, 

wheezes


Cardiovascular exam: PRESENT: RRR.  ABSENT: diastolic murmur, rubs, systolic 

murmur


Pulses: PRESENT: normal dorsalis pedis pul


Vascular exam: PRESENT: normal capillary refill


GI/Abdominal exam: PRESENT: normal bowel sounds, soft.  ABSENT: distended, 

guarding, mass, organolmegaly, rebound, tenderness


Extremities exam: PRESENT: full ROM.  ABSENT: calf tenderness, clubbing, pedal 

edema


Neurological exam: PRESENT: alert, altered, awake


Psychiatric exam: PRESENT: other - Unable to assess as patient has some 

confusion


Skin exam: PRESENT: dry, intact, warm.  ABSENT: cyanosis, rash





Results


Laboratory Results: 


                                        





                                 08/25/19 06:05 





                                 08/25/19 06:05 





                                        











  08/24/19 08/24/19 08/24/19





  14:52 14:52 15:25


 


WBC  16.1 H  


 


RBC  3.41 L  


 


Hgb  12.0 L  


 


Hct  34.9 L  


 


MCV  102 H  


 


MCH  35.3 H  


 


MCHC  34.5  


 


RDW  18.0 H  


 


Plt Count  358  


 


Seg Neutrophils %  Not Reportable  


 


Carbonic Acid   


 


HCO3/H2CO3 Ratio   


 


ABG pH   


 


ABG pCO2   


 


ABG pO2   


 


ABG HCO3   


 


ABG O2 Saturation   


 


ABG Base Excess   


 


FiO2   


 


Sodium   Cancelled  137.0


 


Potassium   Cancelled  3.1 L


 


Chloride   Cancelled  92 L


 


Carbon Dioxide   Cancelled  26


 


Anion Gap   Cancelled  19


 


BUN   Cancelled  29 H


 


Creatinine   Cancelled  1.77 H


 


Est GFR ( Amer)   Cancelled  48 L


 


Est GFR (Non-Af Amer)   Cancelled 


 


Glucose   Cancelled  113 H


 


Calcium   Cancelled  5.5 L*


 


Ionized Calcium Nnamdi   


 


Phosphorus   


 


Magnesium   Cancelled  0.4 L*


 


Total Bilirubin   Cancelled  2.1 H


 


AST   Cancelled  48


 


Alkaline Phosphatase   Cancelled  74


 


Ammonia   


 


Total Protein   Cancelled  8.0


 


Albumin   Cancelled  4.1


 


Amylase   


 


Lipase   


 


TSH   


 


PTH Intact   


 


Urine Color   


 


Urine Appearance   


 


Urine pH   


 


Ur Specific Gravity   


 


Urine Protein   


 


Urine Glucose (UA)   


 


Urine Ketones   


 


Urine Blood   


 


Urine Nitrite   


 


Ur Leukocyte Esterase   


 


Urine WBC (Auto)   


 


Urine RBC (Auto)   














  08/24/19 08/24/19 08/24/19





  15:25 17:08 17:08


 


WBC   


 


RBC   


 


Hgb   


 


Hct   


 


MCV   


 


MCH   


 


MCHC   


 


RDW   


 


Plt Count   


 


Seg Neutrophils %   


 


Carbonic Acid   


 


HCO3/H2CO3 Ratio   


 


ABG pH   


 


ABG pCO2   


 


ABG pO2   


 


ABG HCO3   


 


ABG O2 Saturation   


 


ABG Base Excess   


 


FiO2   


 


Sodium   


 


Potassium   


 


Chloride   


 


Carbon Dioxide   


 


Anion Gap   


 


BUN   


 


Creatinine   


 


Est GFR ( Amer)   


 


Est GFR (Non-Af Amer)   


 


Glucose   


 


Calcium   


 


Ionized Calcium Nnamdi   0.73 L 


 


Phosphorus  6.0 H  


 


Magnesium   


 


Total Bilirubin   


 


AST   


 


Alkaline Phosphatase   


 


Ammonia   


 


Total Protein   


 


Albumin   


 


Amylase   


 


Lipase   


 


TSH   


 


PTH Intact    165.5 H


 


Urine Color   


 


Urine Appearance   


 


Urine pH   


 


Ur Specific Gravity   


 


Urine Protein   


 


Urine Glucose (UA)   


 


Urine Ketones   


 


Urine Blood   


 


Urine Nitrite   


 


Ur Leukocyte Esterase   


 


Urine WBC (Auto)   


 


Urine RBC (Auto)   














  08/24/19 08/24/19 08/24/19





  17:08 17:57 19:20


 


WBC   


 


RBC   


 


Hgb   


 


Hct   


 


MCV   


 


MCH   


 


MCHC   


 


RDW   


 


Plt Count   


 


Seg Neutrophils %   


 


Carbonic Acid  1.25  0.92 L 


 


HCO3/H2CO3 Ratio  19:1  24:1 


 


ABG pH  7.39  7.48 H 


 


ABG pCO2  41.4  30.6 L 


 


ABG pO2  38.4 L*  95.7 


 


ABG HCO3  24.6 H  22.4 


 


ABG O2 Saturation  72.3 L  97.8 


 


ABG Base Excess  -0.3  -0.4 


 


FiO2  2L  2L 


 


Sodium   


 


Potassium   


 


Chloride   


 


Carbon Dioxide   


 


Anion Gap   


 


BUN   


 


Creatinine   


 


Est GFR ( Amer)   


 


Est GFR (Non-Af Amer)   


 


Glucose   


 


Calcium   


 


Ionized Calcium Nnamdi   


 


Phosphorus   


 


Magnesium   


 


Total Bilirubin   


 


AST   


 


Alkaline Phosphatase   


 


Ammonia    < 8.7 L


 


Total Protein   


 


Albumin   


 


Amylase   


 


Lipase   


 


TSH   


 


PTH Intact   


 


Urine Color   


 


Urine Appearance   


 


Urine pH   


 


Ur Specific Gravity   


 


Urine Protein   


 


Urine Glucose (UA)   


 


Urine Ketones   


 


Urine Blood   


 


Urine Nitrite   


 


Ur Leukocyte Esterase   


 


Urine WBC (Auto)   


 


Urine RBC (Auto)   














  08/24/19 08/24/19 08/24/19





  19:20 19:35 21:11


 


WBC   


 


RBC   


 


Hgb   


 


Hct   


 


MCV   


 


MCH   


 


MCHC   


 


RDW   


 


Plt Count   


 


Seg Neutrophils %   


 


Carbonic Acid   


 


HCO3/H2CO3 Ratio   


 


ABG pH   


 


ABG pCO2   


 


ABG pO2   


 


ABG HCO3   


 


ABG O2 Saturation   


 


ABG Base Excess   


 


FiO2   


 


Sodium  135.5 L  


 


Potassium  2.9 L*  


 


Chloride  95 L  


 


Carbon Dioxide  22  


 


Anion Gap  19  


 


BUN  27 H  


 


Creatinine  1.38 H  


 


Est GFR ( Amer)  > 60  


 


Est GFR (Non-Af Amer)   


 


Glucose  115 H  


 


Calcium  6.0 L*  


 


Ionized Calcium Nnamdi    0.78 L


 


Phosphorus   


 


Magnesium  1.6  D  


 


Total Bilirubin   


 


AST   


 


Alkaline Phosphatase   


 


Ammonia   


 


Total Protein   


 


Albumin  3.5  


 


Amylase   


 


Lipase   


 


TSH   


 


PTH Intact   


 


Urine Color   BENNETT 


 


Urine Appearance   CLEAR 


 


Urine pH   6.0 


 


Ur Specific Gravity   1.016 


 


Urine Protein   30 H 


 


Urine Glucose (UA)   NEGATIVE 


 


Urine Ketones   TRACE H 


 


Urine Blood   SMALL H 


 


Urine Nitrite   NEGATIVE 


 


Ur Leukocyte Esterase   NEGATIVE 


 


Urine WBC (Auto)   2 


 


Urine RBC (Auto)   1 














  08/25/19 08/25/19 08/25/19





  00:34 06:05 06:05


 


WBC   12.2 H 


 


RBC   3.06 L 


 


Hgb   10.6 L 


 


Hct   31.4 L 


 


MCV   103 H 


 


MCH   34.7 H 


 


MCHC   33.8 


 


RDW   17.9 H 


 


Plt Count   250 


 


Seg Neutrophils %   Not Reportable 


 


Carbonic Acid   


 


HCO3/H2CO3 Ratio   


 


ABG pH   


 


ABG pCO2   


 


ABG pO2   


 


ABG HCO3   


 


ABG O2 Saturation   


 


ABG Base Excess   


 


FiO2   


 


Sodium    139.1


 


Potassium    3.0 L*


 


Chloride    102


 


Carbon Dioxide    21 L


 


Anion Gap    16


 


BUN    22 H


 


Creatinine    0.99


 


Est GFR ( Amer)    > 60


 


Est GFR (Non-Af Amer)   


 


Glucose    112 H


 


Calcium    7.7 L


 


Ionized Calcium Nnamdi  0.92 L  


 


Phosphorus    5.9 H


 


Magnesium    1.8


 


Total Bilirubin    2.1 H


 


AST    156 H


 


Alkaline Phosphatase    77


 


Ammonia   


 


Total Protein    6.2 L


 


Albumin    3.1 L


 


Amylase    < 30 L


 


Lipase    41.6


 


TSH   


 


PTH Intact   


 


Urine Color   


 


Urine Appearance   


 


Urine pH   


 


Ur Specific Gravity   


 


Urine Protein   


 


Urine Glucose (UA)   


 


Urine Ketones   


 


Urine Blood   


 


Urine Nitrite   


 


Ur Leukocyte Esterase   


 


Urine WBC (Auto)   


 


Urine RBC (Auto)   














  08/25/19 08/25/19





  06:05 06:05


 


WBC  


 


RBC  


 


Hgb  


 


Hct  


 


MCV  


 


MCH  


 


MCHC  


 


RDW  


 


Plt Count  


 


Seg Neutrophils %  


 


Carbonic Acid  


 


HCO3/H2CO3 Ratio  


 


ABG pH  


 


ABG pCO2  


 


ABG pO2  


 


ABG HCO3  


 


ABG O2 Saturation  


 


ABG Base Excess  


 


FiO2  


 


Sodium  


 


Potassium  


 


Chloride  


 


Carbon Dioxide  


 


Anion Gap  


 


BUN  


 


Creatinine  


 


Est GFR ( Amer)  


 


Est GFR (Non-Af Amer)  


 


Glucose  


 


Calcium  


 


Ionized Calcium Nnamdi   1.01 L


 


Phosphorus  


 


Magnesium  


 


Total Bilirubin  


 


AST  


 


Alkaline Phosphatase  


 


Ammonia  


 


Total Protein  


 


Albumin  


 


Amylase  


 


Lipase  


 


TSH  0.48 


 


PTH Intact  


 


Urine Color  


 


Urine Appearance  


 


Urine pH  


 


Ur Specific Gravity  


 


Urine Protein  


 


Urine Glucose (UA)  


 


Urine Ketones  


 


Urine Blood  


 


Urine Nitrite  


 


Ur Leukocyte Esterase  


 


Urine WBC (Auto)  


 


Urine RBC (Auto)  








                                        











  08/24/19 08/24/19





  15:25 15:25


 


Creatine Kinase  948 H 


 


Troponin I   < 0.012











Impressions: 


                                        





Chest X-Ray  08/24/19 17:12


IMPRESSION:  Bibasilar airspace opacities, may be secondary to atelectasis or 

pneumonia.


 














Assessment and Plan





- Diagnosis


(1) Alcohol abuse


Is this a current diagnosis for this admission?: Yes   


Plan: 


Patient and his family state that he last had his normal consumption of alcohol 

2 days ago, which is approximately 2/5 of gin per day.  His wife states that 

this morning he just had a couple of drinks.  He has been an alcoholic for many 

years and is been in rehab in the past.





August 25, 2019-patient with visible shaking at this time.  I have added Ativan 

2 mg IV every 3 hours as needed.  We will aggressively watch patient for any 

signs of DTs and treat aggressively.  I also placed patient on banana bag daily.

 Patient unable to swallow at this time so we cannot give oral medications.








(2) Hypocalcemia


Is this a current diagnosis for this admission?: Yes   


Plan: 


Patient's calcium level in the ER is 5.5 and are normal range here is from 8.4-

10.2.  Patient's albumin level is normal





August 25, 2019-patient's calcium now7.7 after 10 runs of 1 g of calcium 

gluconate.  Patient is now on calcium replacement protocol.  I have added a 

vitamin D and a PTH to determine if these may be contributing factors to 

patient's hypocalcemia.  We will continue to follow








(3) Hypomagnesemia


Is this a current diagnosis for this admission?: Yes   


Plan: 


Patient's magnesium level is 0.4 with our normal range being 1.6-2.3





August 25, 2019-magnesium level normalized today.  We will repeat magnesium leve

l in a.m.








(4) Tremor


Is this a current diagnosis for this admission?: Yes   


Plan: 


She has what appears to be involuntary tremors of all 4 extremities, with the 

upper worse in the lower patient also has what appears to be carpal spasms of 

both hands





August 25, 2019-patient does have involuntary jerking motions of all 

extremities.  I suspect this is the start of alcohol withdrawal.  We are going 

to aggressively monitor patient and aggressively treat if he does go to DTs.  

Calcium is almost corrected at this time so I doubt this is still tetany.  We 

will continue to follow with serial calcium levels








(5) Hypertension


Qualifiers: 


   Hypertension type: essential hypertension   Qualified Code(s): I10 - 

Essential (primary) hypertension   


Is this a current diagnosis for this admission?: Yes   


Plan: 


Patient is taking medication for hypertension Coreg 3.125 , Lasix 40 mg a day, 

Norvasc 5mg





August 25, 2019-patient is stable with his blood pressure at this time.  We will

monitor his blood pressure if he starts having hypertension we will treat as 

appropriate.  Patient is known alcoholic and may go to DTs so at that time 

hypertension will be signs of withdrawal.








(6) Hypokalemia


Is this a current diagnosis for this admission?: Yes   


Plan: 


August 25, 2019-patient had a complete bit of potassium overnight.  Potassium 

level 3.0 at this time.  Patient continues to get potassium runs I will add 40 

mg of Potassium to normal saline to run at 150 mL/h as well.  We will continue 

to monitor with serial potassium levels.








(7) Acute kidney injury


Is this a current diagnosis for this admission?: Yes   


Plan: 


August 25, 2019-patient had a admission creatinine of 1.38 it is down to 0.99 

this morning.  We will continue to hydrate we will continue with daily renal 

panels.








- Time


Time Spent with patient: 25-34 minutes





- Inpatient Certification


Based on my medical assessment, after consideration of the patient's c

omorbidities, presenting symptoms, or acuity I expect that the services needed 

warrant INPATIENT care.: Yes


I certify that my determination is in accordance with my understanding of 

Medicare's requirements for reasonable and necessary INPATIENT services [42 CFR 

412.3e].: Yes


Medical Necessity: Other - IV fluids, rally pack, electrolyte replacement

## 2019-08-26 LAB
ABSOLUTE LYMPHOCYTES# (MANUAL): 0.8 10^3/UL (ref 0.5–4.7)
ABSOLUTE MONOCYTES # (MANUAL): 1.1 10^3/UL (ref 0.1–1.4)
ADD MANUAL DIFF: YES
ALBUMIN SERPL-MCNC: 3 G/DL (ref 3.5–5)
ALP SERPL-CCNC: 81 U/L (ref 38–126)
ANION GAP SERPL CALC-SCNC: 15 MMOL/L (ref 5–19)
ANISOCYTOSIS BLD QL SMEAR: (no result)
AST SERPL-CCNC: 78 U/L (ref 17–59)
BASOPHILS NFR BLD MANUAL: 0 % (ref 0–2)
BILIRUB DIRECT SERPL-MCNC: 0.8 MG/DL (ref 0–0.4)
BILIRUB SERPL-MCNC: 1.6 MG/DL (ref 0.2–1.3)
BUN SERPL-MCNC: 18 MG/DL (ref 7–20)
BURR CELLS BLD QL SMEAR: SLIGHT
CALCIUM: 8.8 MG/DL (ref 8.4–10.2)
CHLORIDE SERPL-SCNC: 112 MMOL/L (ref 98–107)
CO2 SERPL-SCNC: 20 MMOL/L (ref 22–30)
DACRYOCYTES BLD QL SMEAR: (no result)
EOSINOPHIL NFR BLD MANUAL: 0 % (ref 0–6)
ERYTHROCYTE [DISTWIDTH] IN BLOOD BY AUTOMATED COUNT: 18.7 % (ref 11.5–14)
GLUCOSE SERPL-MCNC: 113 MG/DL (ref 75–110)
HCT VFR BLD CALC: 31.1 % (ref 37.9–51)
HGB BLD-MCNC: 10.4 G/DL (ref 13.5–17)
MCH RBC QN AUTO: 34.5 PG (ref 27–33.4)
MCHC RBC AUTO-ENTMCNC: 33.4 G/DL (ref 32–36)
MCV RBC AUTO: 103 FL (ref 80–97)
MONOCYTES % (MANUAL): 8 % (ref 3–13)
PHOSPHATE SERPL-MCNC: 3.6 MG/DL (ref 2.5–4.5)
PLATELET # BLD: 338 10^3/UL (ref 150–450)
PLATELET COMMENT: ADEQUATE
POIKILOCYTOSIS BLD QL SMEAR: (no result)
POTASSIUM SERPL-SCNC: 3.9 MMOL/L (ref 3.6–5)
PROT SERPL-MCNC: 6.1 G/DL (ref 6.3–8.2)
RBC # BLD AUTO: 3.01 10^6/UL (ref 4.35–5.55)
SEGMENTED NEUTROPHILS % (MAN): 86 % (ref 42–78)
TOTAL CELLS COUNTED BLD: 100
TOXIC GRANULES BLD QL SMEAR: SLIGHT
VARIANT LYMPHS NFR BLD MANUAL: 4 % (ref 13–45)
WBC # BLD AUTO: 13.5 10^3/UL (ref 4–10.5)
WBC TOXIC VACUOLES BLD QL SMEAR: PRESENT

## 2019-08-26 RX ADMIN — MAGNESIUM SULFATE IN DEXTROSE SCH MLS/HR: 10 INJECTION, SOLUTION INTRAVENOUS at 21:07

## 2019-08-26 RX ADMIN — Medication SCH: at 14:29

## 2019-08-26 RX ADMIN — HEPARIN SODIUM SCH UNIT: 5000 INJECTION, SOLUTION INTRAVENOUS; SUBCUTANEOUS at 14:15

## 2019-08-26 RX ADMIN — LORAZEPAM PRN MG: 2 INJECTION INTRAMUSCULAR; INTRAVENOUS at 05:38

## 2019-08-26 RX ADMIN — METOPROLOL TARTRATE SCH MG: 5 INJECTION, SOLUTION INTRAVENOUS at 01:37

## 2019-08-26 RX ADMIN — LORAZEPAM PRN MG: 2 INJECTION INTRAMUSCULAR; INTRAVENOUS at 18:30

## 2019-08-26 RX ADMIN — POTASSIUM CHLORIDE AND SODIUM CHLORIDE PRN MLS/HR: 900; 300 INJECTION, SOLUTION INTRAVENOUS at 06:41

## 2019-08-26 RX ADMIN — METOCLOPRAMIDE SCH MLS/HR: 5 INJECTION, SOLUTION INTRAMUSCULAR; INTRAVENOUS at 17:25

## 2019-08-26 RX ADMIN — MAGNESIUM SULFATE IN DEXTROSE SCH MLS/HR: 10 INJECTION, SOLUTION INTRAVENOUS at 22:20

## 2019-08-26 RX ADMIN — LORAZEPAM PRN MG: 2 INJECTION INTRAMUSCULAR; INTRAVENOUS at 04:28

## 2019-08-26 RX ADMIN — LORAZEPAM PRN MG: 2 INJECTION INTRAMUSCULAR; INTRAVENOUS at 09:57

## 2019-08-26 RX ADMIN — VITAMIN D, TAB 1000IU (100/BT) SCH: 25 TAB at 09:59

## 2019-08-26 RX ADMIN — DEXTROSE AND SODIUM CHLORIDE PRN MLS/HR: 5; .45 INJECTION, SOLUTION INTRAVENOUS at 11:40

## 2019-08-26 RX ADMIN — PANTOPRAZOLE SODIUM SCH MG: 40 INJECTION, POWDER, FOR SOLUTION INTRAVENOUS at 09:58

## 2019-08-26 RX ADMIN — METOPROLOL TARTRATE SCH MG: 5 INJECTION, SOLUTION INTRAVENOUS at 14:15

## 2019-08-26 RX ADMIN — DIAZEPAM PRN MG: 5 INJECTION, SOLUTION INTRAMUSCULAR; INTRAVENOUS at 02:28

## 2019-08-26 RX ADMIN — HEPARIN SODIUM SCH UNIT: 5000 INJECTION, SOLUTION INTRAVENOUS; SUBCUTANEOUS at 21:07

## 2019-08-26 RX ADMIN — DIAZEPAM PRN MG: 5 INJECTION, SOLUTION INTRAMUSCULAR; INTRAVENOUS at 21:06

## 2019-08-26 RX ADMIN — LORAZEPAM PRN MG: 2 INJECTION INTRAMUSCULAR; INTRAVENOUS at 14:16

## 2019-08-26 RX ADMIN — LORAZEPAM PRN MG: 2 INJECTION INTRAMUSCULAR; INTRAVENOUS at 03:27

## 2019-08-26 RX ADMIN — LORAZEPAM PRN MG: 2 INJECTION INTRAMUSCULAR; INTRAVENOUS at 02:28

## 2019-08-26 RX ADMIN — LORAZEPAM PRN MG: 2 INJECTION INTRAMUSCULAR; INTRAVENOUS at 03:58

## 2019-08-26 RX ADMIN — HEPARIN SODIUM SCH UNIT: 5000 INJECTION, SOLUTION INTRAVENOUS; SUBCUTANEOUS at 06:03

## 2019-08-26 RX ADMIN — Medication SCH: at 06:03

## 2019-08-26 RX ADMIN — METOPROLOL TARTRATE SCH MG: 5 INJECTION, SOLUTION INTRAVENOUS at 09:57

## 2019-08-26 RX ADMIN — Medication SCH ML: at 21:07

## 2019-08-26 RX ADMIN — METOPROLOL TARTRATE SCH MG: 5 INJECTION, SOLUTION INTRAVENOUS at 06:02

## 2019-08-26 RX ADMIN — MONTELUKAST SODIUM SCH: 10 TABLET, FILM COATED ORAL at 21:08

## 2019-08-26 RX ADMIN — METOPROLOL TARTRATE SCH MG: 5 INJECTION, SOLUTION INTRAVENOUS at 21:07

## 2019-08-26 RX ADMIN — METOPROLOL TARTRATE SCH MG: 5 INJECTION, SOLUTION INTRAVENOUS at 17:20

## 2019-08-26 RX ADMIN — LORAZEPAM PRN MG: 2 INJECTION INTRAMUSCULAR; INTRAVENOUS at 21:06

## 2019-08-26 NOTE — PDOC PROGRESS REPORT
Subjective


Progress Note for:: 08/26/19


Subjective:: 





August 25, 2019-no complaints at this time.  Patient with visible shakes not 

sure if this is beginning of withdrawal or from his tetany





August 26, 2019-confused this a.m.  Patient going through slight DTs.  Receiving

Ativan as needed


Reason For Visit: 


ALCOHOL ABUSE, HYPOMAGNESIUM,HYPOCALCEMIA,HYPERTEN








Physical Exam


Vital Signs: 


                                        











Temp Pulse Resp BP Pulse Ox


 


 99.7 F   110 H  33 H  135/90 H  96 


 


 08/26/19 08:00  08/26/19 10:00  08/26/19 10:00  08/26/19 10:00  08/26/19 10:00








                                 Intake & Output











 08/25/19 08/26/19 08/27/19





 06:59 06:59 06:59


 


Intake Total 3324.2 2794 


 


Output Total 1650 2655 350


 


Balance 1674.2 139 -350


 


Weight 92 kg 94 kg 











General appearance: PRESENT: no acute distress, well-developed, well-nourished


Neck exam: ABSENT: carotid bruit, JVD, lymphadenopathy, thyromegaly


Respiratory exam: PRESENT: clear to auscultation dax.  ABSENT: rales, rhonchi, 

wheezes


Cardiovascular exam: PRESENT: RRR, tachycardia


Pulses: PRESENT: +2 pedal pulses bilateral


Vascular exam: PRESENT: normal capillary refill


GI/Abdominal exam: PRESENT: normal bowel sounds, soft.  ABSENT: distended, 

guarding, mass, organolmegaly, rebound, tenderness


Extremities exam: PRESENT: calf tenderness, full ROM.  ABSENT: clubbing, pedal 

edema


Neurological exam: PRESENT: awake


Psychiatric exam: PRESENT: anxious


Skin exam: PRESENT: dry, intact, warm.  ABSENT: cyanosis, rash





Results


Laboratory Results: 


                                        





                                 08/26/19 03:48 





                                 08/26/19 03:48 





                                        











  08/25/19 08/25/19 08/25/19





  12:07 19:15 19:15


 


WBC   


 


RBC   


 


Hgb   


 


Hct   


 


MCV   


 


MCH   


 


MCHC   


 


RDW   


 


Plt Count   


 


Seg Neutrophils %   


 


Sodium   


 


Potassium   3.8 


 


Chloride   


 


Carbon Dioxide   


 


Anion Gap   


 


BUN   


 


Creatinine   


 


Est GFR (African Amer)   


 


Glucose   


 


Calcium   


 


Ionized Calcium Nnamdi  1.07 L   1.11 L


 


Phosphorus   3.3  D 


 


Magnesium   1.7 


 


Total Bilirubin   


 


AST   


 


Alkaline Phosphatase   


 


Total Protein   


 


Albumin   














  08/26/19 08/26/19 08/26/19





  03:48 03:48 03:48


 


WBC  13.5 H  


 


RBC  3.01 L  


 


Hgb  10.4 L  


 


Hct  31.1 L  


 


MCV  103 H  


 


MCH  34.5 H  


 


MCHC  33.4  


 


RDW  18.7 H  


 


Plt Count  338  


 


Seg Neutrophils %  Not Reportable  


 


Sodium   147.1 H 


 


Potassium   3.9 


 


Chloride   112 H 


 


Carbon Dioxide   20 L 


 


Anion Gap   15 


 


BUN   18 


 


Creatinine   0.82 


 


Est GFR ( Amer)   > 60 


 


Glucose   113 H 


 


Calcium   8.8 


 


Ionized Calcium Nnamdi    1.16


 


Phosphorus   3.6 


 


Magnesium   1.5 L 


 


Total Bilirubin   1.6 H 


 


AST   78 H 


 


Alkaline Phosphatase   81 


 


Total Protein   6.1 L 


 


Albumin   3.0 L 








                                        











  08/24/19 08/24/19





  15:25 15:25


 


Creatine Kinase  948 H 


 


Troponin I   < 0.012











Impressions: 


                                        





Chest X-Ray  08/24/19 17:12


IMPRESSION:  Bibasilar airspace opacities, may be secondary to atelectasis or 

pneumonia.


 














Assessment and Plan





- Diagnosis


(1) Alcohol abuse


Is this a current diagnosis for this admission?: Yes   


Plan: 


Patient and his family state that he last had his normal consumption of alcohol 

2 days ago, which is approximately 2/5 of gin per day.  His wife states that 

this morning he just had a couple of drinks.  He has been an alcoholic for many 

years and is been in rehab in the past.





August 25, 2019-patient with visible shaking at this time.  I have added Ativan 

2 mg IV every 3 hours as needed.  We will aggressively watch patient for any 

signs of DTs and treat aggressively.  I also placed patient on banana bag daily.

 Patient unable to swallow at this time so we cannot give oral medications.





August 26, 2019-patient receiving PRN Ativan at this time.  He is in the 

beginning stages of DTs.  We are treating him aggressively he continues with 

rally bag daily.  Patient unable to swallow.  Continue to follow








(2) Hypocalcemia


Is this a current diagnosis for this admission?: Yes   


Plan: 


Patient's calcium level in the ER is 5.5 and are normal range here is from 8.4-

10.2.  Patient's albumin level is normal





August 25, 2019-patient's calcium now7.7 after 10 runs of 1 g of calcium 

gluconate.  Patient is now on calcium replacement protocol.  I have added a 

vitamin D and a PTH to determine if these may be contributing factors to 

patient's hypocalcemia.  We will continue to follow





August 26, 2019-patient with low vitamin D most likely cause of his 

hypocalcemia.  Unable to take oral replacement at this time.  Once patient is 

awake and able to take oral we will give him oral vitamin D replacement..








(3) Hypomagnesemia


Is this a current diagnosis for this admission?: Yes   


Plan: 


Patient's magnesium level is 0.4 with our normal range being 1.6-2.3





August 25, 2019-magnesium level normalized today.  We will repeat magnesium 

level in a.m.





August 26, 2019-magnesium 1.5 this a.m.  Magnesium replacement per protocol 

repeat magnesium level in the a.m.








(4) Tremor


Is this a current diagnosis for this admission?: Yes   


Plan: 


She has what appears to be involuntary tremors of all 4 extremities, with the 

upper worse in the lower patient also has what appears to be carpal spasms of 

both hands





August 25, 2019-patient does have involuntary jerking motions of all 

extremities.  I suspect this is the start of alcohol withdrawal.  We are going 

to aggressively monitor patient and aggressively treat if he does go to DTs.  

Calcium is almost corrected at this time so I doubt this is still tetany.  We 

will continue to follow with serial calcium levels





August 26, 2019-patient continues with slight tremor at this time.  Patient is 

getting PRN Ativan for withdrawal.  Calcium level has normalized.  Continue to f

ollow and treat aggressively for withdrawal








(5) Hypertension


Qualifiers: 


   Hypertension type: essential hypertension   Qualified Code(s): I10 - 

Essential (primary) hypertension   


Is this a current diagnosis for this admission?: Yes   


Plan: 


Patient is taking medication for hypertension Coreg 3.125 , Lasix 40 mg a day, 

Norvasc 5mg





August 25, 2019-patient is stable with his blood pressure at this time.  We will

monitor his blood pressure if he starts having hypertension we will treat as 

appropriate.  Patient is known alcoholic and may go to DTs so at that time 

hypertension will be signs of withdrawal.





August 26, 2019-patient becoming hypotensive most likely from onset of DTs.  

Patient getting PRN Ativan as needed hydralazine.  Continue to follow.








(6) Hypokalemia


Is this a current diagnosis for this admission?: Yes   


Plan: 


August 25, 2019-patient had a complete bit of potassium overnight.  Potassium le

kavin 3.0 at this time.  Patient continues to get potassium runs I will add 40 mg 

of Potassium to normal saline to run at 150 mL/h as well.  We will continue to 

monitor with serial potassium levels.





August 26, 2019-hypokalemia has resolved at this time continue to follow daily 

CMP's.








(7) Acute kidney injury


Is this a current diagnosis for this admission?: Yes   


Plan: 


August 25, 2019-patient had a admission creatinine of 1.38 it is down to 0.99 

this morning.  We will continue to hydrate we will continue with daily renal 

panels.





August 26, 2019-resolved.  Hydration continues however have changed fluids to D5

one half normal saline at 125 mL/h.  We will continue to follow daily renal 

panels.








(8) Hypernatremia


Is this a current diagnosis for this admission?: Yes   


Plan: 


8/26/2019-patient with slight hyponatremia most likely from IV fluids.  At this 

time I have changed his IV fluid from normal saline to D5 one half normal saline

at 125 mL an hour.  We will continue to follow with daily CMP's.








(9) Hyperbilirubinemia


Is this a current diagnosis for this admission?: Yes   


Plan: 


8/26/2019-total bili 1.6 direct bili 0.8.  Patient does have a mildly elevated 

AST we will continue to follow daily CMP's.








- Time


Time Spent with patient: 25-34 minutes





- Inpatient Certification


Based on my medical assessment, after consideration of the patient's 

comorbidities, presenting symptoms, or acuity I expect that the services needed 

warrant INPATIENT care.: Yes


I certify that my determination is in accordance with my understanding of 

Medicare's requirements for reasonable and necessary INPATIENT services [42 CFR 

412.3e].: Yes


Medical Necessity: Other - IV fluids, DVT prophylaxis, banana bags

## 2019-08-26 NOTE — EKG REPORT
SEVERITY:- ABNORMAL ECG -

SINUS TACHYCARDIA

VENTRICULAR PREMATURE COMPLEXES

MINIMAL ST ELEVATION, INFERIOR LEADS

BORDERLINE PROLONGED QT INTERVAL

DEFECTIVE EKG, SEVERE BASELINE ARTEFACTS

:

Confirmed by: Michael Harper MD 26-Aug-2019 07:04:26 H/O aortic root repair    History of eye surgery  endophthalmitis in 2014

## 2019-08-27 LAB
ADD MANUAL DIFF: NO
ALBUMIN SERPL-MCNC: 3.1 G/DL (ref 3.5–5)
ALP SERPL-CCNC: 107 U/L (ref 38–126)
ANION GAP SERPL CALC-SCNC: 11 MMOL/L (ref 5–19)
AST SERPL-CCNC: 101 U/L (ref 17–59)
BASOPHILS # BLD AUTO: 0 10^3/UL (ref 0–0.2)
BASOPHILS NFR BLD AUTO: 0.5 % (ref 0–2)
BILIRUB DIRECT SERPL-MCNC: 0.9 MG/DL (ref 0–0.4)
BILIRUB SERPL-MCNC: 1.4 MG/DL (ref 0.2–1.3)
BUN SERPL-MCNC: 15 MG/DL (ref 7–20)
CALCIUM: 9 MG/DL (ref 8.4–10.2)
CHLORIDE SERPL-SCNC: 116 MMOL/L (ref 98–107)
CO2 SERPL-SCNC: 24 MMOL/L (ref 22–30)
EOSINOPHIL # BLD AUTO: 0.1 10^3/UL (ref 0–0.6)
EOSINOPHIL NFR BLD AUTO: 0.5 % (ref 0–6)
ERYTHROCYTE [DISTWIDTH] IN BLOOD BY AUTOMATED COUNT: 18.6 % (ref 11.5–14)
GLUCOSE SERPL-MCNC: 119 MG/DL (ref 75–110)
HCT VFR BLD CALC: 32.7 % (ref 37.9–51)
HGB BLD-MCNC: 11.1 G/DL (ref 13.5–17)
LYMPHOCYTES # BLD AUTO: 0.6 10^3/UL (ref 0.5–4.7)
LYMPHOCYTES NFR BLD AUTO: 5.2 % (ref 13–45)
MCH RBC QN AUTO: 35.2 PG (ref 27–33.4)
MCHC RBC AUTO-ENTMCNC: 34 G/DL (ref 32–36)
MCV RBC AUTO: 104 FL (ref 80–97)
MONOCYTES # BLD AUTO: 1.1 10^3/UL (ref 0.1–1.4)
MONOCYTES NFR BLD AUTO: 10.7 % (ref 3–13)
NEUTROPHILS # BLD AUTO: 8.9 10^3/UL (ref 1.7–8.2)
NEUTS SEG NFR BLD AUTO: 83.1 % (ref 42–78)
PHOSPHATE SERPL-MCNC: 3.9 MG/DL (ref 2.5–4.5)
PLATELET # BLD: 404 10^3/UL (ref 150–450)
POTASSIUM SERPL-SCNC: 3.7 MMOL/L (ref 3.6–5)
PROT SERPL-MCNC: 6.6 G/DL (ref 6.3–8.2)
RBC # BLD AUTO: 3.15 10^6/UL (ref 4.35–5.55)
TOTAL CELLS COUNTED % (AUTO): 100 %
WBC # BLD AUTO: 10.7 10^3/UL (ref 4–10.5)

## 2019-08-27 RX ADMIN — METOPROLOL TARTRATE SCH MG: 5 INJECTION, SOLUTION INTRAVENOUS at 19:09

## 2019-08-27 RX ADMIN — DEXTROSE AND SODIUM CHLORIDE PRN MLS/HR: 5; .45 INJECTION, SOLUTION INTRAVENOUS at 01:01

## 2019-08-27 RX ADMIN — METOPROLOL TARTRATE SCH MG: 5 INJECTION, SOLUTION INTRAVENOUS at 15:24

## 2019-08-27 RX ADMIN — CEFTRIAXONE SCH MLS/HR: 2 INJECTION, SOLUTION INTRAVENOUS at 12:40

## 2019-08-27 RX ADMIN — Medication SCH: at 21:20

## 2019-08-27 RX ADMIN — METOCLOPRAMIDE SCH: 5 INJECTION, SOLUTION INTRAMUSCULAR; INTRAVENOUS at 19:04

## 2019-08-27 RX ADMIN — METOPROLOL TARTRATE SCH MG: 5 INJECTION, SOLUTION INTRAVENOUS at 01:00

## 2019-08-27 RX ADMIN — SODIUM CHLORIDE SCH MG: 234 INJECTION INTRAMUSCULAR; INTRAVENOUS; SUBCUTANEOUS at 17:12

## 2019-08-27 RX ADMIN — VANCOMYCIN HYDROCHLORIDE SCH MLS/HR: 1 INJECTION, POWDER, LYOPHILIZED, FOR SOLUTION INTRAVENOUS at 15:43

## 2019-08-27 RX ADMIN — METOPROLOL TARTRATE SCH MG: 5 INJECTION, SOLUTION INTRAVENOUS at 21:19

## 2019-08-27 RX ADMIN — VANCOMYCIN HYDROCHLORIDE SCH MLS/HR: 1 INJECTION, POWDER, LYOPHILIZED, FOR SOLUTION INTRAVENOUS at 21:51

## 2019-08-27 RX ADMIN — VITAMIN D, TAB 1000IU (100/BT) SCH: 25 TAB at 10:00

## 2019-08-27 RX ADMIN — LORAZEPAM PRN MG: 2 INJECTION INTRAMUSCULAR; INTRAVENOUS at 02:48

## 2019-08-27 RX ADMIN — CEFTRIAXONE SCH MLS/HR: 2 INJECTION, SOLUTION INTRAVENOUS at 21:50

## 2019-08-27 RX ADMIN — DIAZEPAM PRN MG: 5 INJECTION, SOLUTION INTRAMUSCULAR; INTRAVENOUS at 02:07

## 2019-08-27 RX ADMIN — HEPARIN SODIUM SCH UNIT: 5000 INJECTION, SOLUTION INTRAVENOUS; SUBCUTANEOUS at 05:12

## 2019-08-27 RX ADMIN — METOPROLOL TARTRATE SCH MG: 5 INJECTION, SOLUTION INTRAVENOUS at 10:52

## 2019-08-27 RX ADMIN — SODIUM CHLORIDE SCH MG: 234 INJECTION INTRAMUSCULAR; INTRAVENOUS; SUBCUTANEOUS at 15:24

## 2019-08-27 RX ADMIN — MONTELUKAST SODIUM SCH: 10 TABLET, FILM COATED ORAL at 21:20

## 2019-08-27 RX ADMIN — HEPARIN SODIUM SCH: 5000 INJECTION, SOLUTION INTRAVENOUS; SUBCUTANEOUS at 15:08

## 2019-08-27 RX ADMIN — Medication SCH ML: at 05:13

## 2019-08-27 RX ADMIN — LORAZEPAM PRN MG: 2 INJECTION INTRAMUSCULAR; INTRAVENOUS at 00:58

## 2019-08-27 RX ADMIN — Medication SCH: at 15:59

## 2019-08-27 RX ADMIN — METOPROLOL TARTRATE SCH MG: 5 INJECTION, SOLUTION INTRAVENOUS at 05:12

## 2019-08-27 RX ADMIN — PANTOPRAZOLE SODIUM SCH MG: 40 INJECTION, POWDER, FOR SOLUTION INTRAVENOUS at 10:51

## 2019-08-27 NOTE — RADIOLOGY REPORT (SQ)
EXAM DESCRIPTION:  CT HEAD WITHOUT



COMPLETED DATE/TIME:  8/27/2019 2:13 pm



REASON FOR STUDY:  AMS



COMPARISON:  10/14/2016



TECHNIQUE:  Axial images acquired through the brain without intravenous contrast.  Images reviewed wi
th bone, brain and subdural windows.  Additional sagittal and coronal reconstructions were generated.
 Images stored on PACS.

All CT scanners at this facility use dose modulation, iterative reconstruction, and/or weight based d
osing when appropriate to reduce radiation dose to as low as reasonably achievable (ALARA).

[facility]: Dose Right  [facility]: CareDose    [facility]: Dose Right    [facility]: Teradose 4D    [facility]: Smart Technologies



RADIATION DOSE:  CT Rad equipment meets quality standard of care and radiation dose reduction techniq
ues were employed. CTDIvol: 48.6 mGy. DLP: 954 mGy-cm. mGy.



LIMITATIONS:  None.



FINDINGS:  VENTRICLES: Normal size and contour.

CEREBRUM: No masses.  No hemorrhage.  No midline shift.  No evidence for acute infarction. Normal gra
y/white matter differentiation. No areas of low density in the white matter.

CEREBELLUM: No masses.  No hemorrhage.  No alteration of density.  No evidence for acute infarction.

EXTRAAXIAL SPACES: No fluid collections.  No masses.

ORBITS AND GLOBE: No intra- or extraconal masses.  Normal contour of globe without masses.

CALVARIUM: No fracture.

PARANASAL SINUSES: No fluid or mucosal thickening.

SOFT TISSUES: No mass or hematoma.

OTHER: No other significant finding.



IMPRESSION:  No acute intracranial pathology.

EVIDENCE OF ACUTE STROKE: NO.



COMMENT:  Quality ID # 436: Final reports with documentation of one or more dose reduction techniques
 (e.g., Automated exposure control, adjustment of the mA and/or kV according to patient size, use of 
iterative reconstruction technique)



TECHNICAL DOCUMENTATION:  JOB ID:  [id number]

 2011 [employer]- All Rights Reserved



Reading location - IP/workstation name: [email or web address]

## 2019-08-27 NOTE — PDOC PROGRESS REPORT
Subjective


Progress Note for:: 08/27/19


Subjective:: 


This is a 59 yr old -American male with a past medical history of chronic

alcohol abuse and hypertension who was brought in due to confusion 5 days prior 

to admission.  He was admitted for acute renal failure, severe hypokalemia and 

hypomagnesemia and treated for possible alcohol withdrawal started on CIWA 

protocol.





Reviewed chart and ICU course.  Appears patient has not shown significant 

improvement in terms of his mental status since admission.  Upon encounter this 

morning, patient remains lethargic.  He moans but remains nonverbal.  He did get

Ativan last night around 2:30 AM.  There is minimal nuchal rigidity upon 

examination. Negative Brudzinski sign. He did grimace upon attempt to elicit a 

Kernig's sign. 





Reason For Visit: 


ALCOHOL ABUSE, HYPOMAGNESIUM,HYPOCALCEMIA,HYPERTEN








Physical Exam


Vital Signs: 


                                        











Temp Pulse Resp BP Pulse Ox


 


 100.4 F   115 H  28 H  143/101 H  96 


 


 08/27/19 08:00  08/27/19 10:00  08/27/19 10:15  08/27/19 10:15  08/27/19 10:15








                                 Intake & Output











 08/26/19 08/27/19 08/28/19





 06:59 06:59 06:59


 


Intake Total 2794 3453 


 


Output Total 2655 3850 86210


 


Balance 139 -397 -59247


 


Weight 207 lb 3.752 oz 208 lb 8.917 oz 











General appearance: PRESENT: no acute distress, other - lethargic


Eye exam: PRESENT: conjunctiva pink, EOMI, PERRLA.  ABSENT: scleral icterus


Mouth exam: PRESENT: moist, tongue midline


Neck exam: ABSENT: carotid bruit, JVD, lymphadenopathy, thyromegaly


Respiratory exam: PRESENT: rhonchi.  ABSENT: rales, wheezes


Cardiovascular exam: PRESENT: RRR.  ABSENT: diastolic murmur, rubs, systolic 

murmur


Pulses: PRESENT: normal dorsalis pedis pul


GI/Abdominal exam: PRESENT: normal bowel sounds, soft.  ABSENT: distended, 

guarding, mass, organolmegaly, rebound, tenderness


Rectal exam: PRESENT: deferred


Extremities exam: PRESENT: full ROM.  ABSENT: calf tenderness, clubbing, pedal 

edema


Neurological exam: PRESENT: altered, other - Minimal nuchal rigidity upon 

examination. Negative Brudzinski sign. He did grimace upon attempt to elicit a 

Kernig's sign..  ABSENT: oriented to person, oriented to place, oriented to 

time, oriented to situation





Results


Laboratory Results: 


                                        





                                 08/27/19 05:59 





                                 08/27/19 05:59 





                                        











  08/27/19 08/27/19 08/27/19





  01:16 05:59 05:59


 


WBC   10.7 H 


 


RBC   3.15 L 


 


Hgb   11.1 L 


 


Hct   32.7 L 


 


MCV   104 H 


 


MCH   35.2 H 


 


MCHC   34.0 


 


RDW   18.6 H 


 


Plt Count   404 


 


Seg Neutrophils %   83.1 H 


 


Sodium    150.9 H


 


Potassium    3.7


 


Chloride    116 H


 


Carbon Dioxide    24


 


Anion Gap    11


 


BUN    15


 


Creatinine    0.80


 


Est GFR ( Amer)    > 60


 


Glucose    119 H


 


Calcium    9.0


 


Ionized Calcium Nnamdi   


 


Phosphorus    3.9


 


Magnesium  1.9  


 


Total Bilirubin    1.4 H


 


AST    101 H


 


Alkaline Phosphatase    107


 


Total Protein    6.6


 


Albumin    3.1 L














  08/27/19





  05:59


 


WBC 


 


RBC 


 


Hgb 


 


Hct 


 


MCV 


 


MCH 


 


MCHC 


 


RDW 


 


Plt Count 


 


Seg Neutrophils % 


 


Sodium 


 


Potassium 


 


Chloride 


 


Carbon Dioxide 


 


Anion Gap 


 


BUN 


 


Creatinine 


 


Est GFR (African Amer) 


 


Glucose 


 


Calcium 


 


Ionized Calcium Nnamdi  1.11 L


 


Phosphorus 


 


Magnesium 


 


Total Bilirubin 


 


AST 


 


Alkaline Phosphatase 


 


Total Protein 


 


Albumin 








                                        











  08/24/19 08/24/19





  15:25 15:25


 


Creatine Kinase  948 H 


 


Troponin I   < 0.012











Impressions: 


                                        





Chest X-Ray  08/27/19 00:00


IMPRESSION:


 


No acute cardiopulmonary abnormality


 


 


copyright 2011 [employer]- All Rights Reserved


 














Assessment and Plan





- Diagnosis


(1) Acute encephalopathy


Is this a current diagnosis for this admission?: Yes   


Plan: 


He was treated for possible DT. Appears patient has not shown significant 

improvement in terms of his mental status since admission.  Upon encounter this 

morning, patient remains lethargic.  He moans but remains nonverbal.  He did get

Ativan last night around 2:30 AM.  There is minimal nuchal rigidity upon 

examination. Negative Brudzinski sign. He did grimace upon attempt to elicit a 

Kernig's sign. 





Etiology is multifactorial. Differentials include alcohol withdrawal, metabolic,

medications and possibly infectious. His sodium today is 150 but previous Na 

levels is not severe enough to cause his encephaloapthy. Hold off on Ativan for 

now. With his leukocytosis, low grade fevers and equivocal meningeal signs, will

consider LP if he continues to be lethargic despite being off benzos. Will order

for a head CT as well.











(2) Acute kidney injury


Is this a current diagnosis for this admission?: Yes   


Plan: 


Resolved with IV fluids.








(3) Hypernatremia


Is this a current diagnosis for this admission?: Yes   


Plan: 


Continue D5 half saline. Will check a serum osm.








(4) Hypokalemia


Is this a current diagnosis for this admission?: Yes   


Plan: 


Repleted.








(5) Hypomagnesemia


Is this a current diagnosis for this admission?: Yes   


Plan: 


Repleted.








(6) Alcohol abuse


Is this a current diagnosis for this admission?: Yes   





- Time


Time Spent with patient: 25-34 minutes

## 2019-08-27 NOTE — RADIOLOGY REPORT (SQ)
EXAM DESCRIPTION:  U/S ABDOMEN LIMITED W/O DOP



COMPLETED DATE/TIME:  8/27/2019 7:32 pm



REASON FOR STUDY:  RUQ US, hyperbilirubinemia, AMS



COMPARISON:  7/21/2015



TECHNIQUE:  Dynamic and static grayscale images acquired of the abdomen and recorded on PACS. Additio
nal selected color Doppler and spectral images recorded.



LIMITATIONS:  Limited by patient's inability to the lie supine.  Portable exam.



FINDINGS:  PANCREAS: Poorly seen.

LIVER: No masses. Echotexture normal.

LIVER VASCULATURE: Poorly seen.

GALLBLADDER: Not seen.

ULTRASOUND-DETECTED REAVES'S SIGN: Negative.

INTRAHEPATIC DUCTS AND COMMON DUCT: Common bile duct is not seen.

INFERIOR VENA CAVA: Not imaged.

AORTA: No aneurysm.

RIGHT KIDNEY:  Normal size, 8.9 cm. Normal echogenicity. No solid or suspicious masses. No hydronephr
osis. No calcifications.

PERITONEAL AND RIGHT PLEURAL SPACE: No ascites or effusions.

OTHER: No other significant findings.



IMPRESSION:  Very limited study.  Findings as described.



TECHNICAL DOCUMENTATION:  JOB ID:  [id number]

 2011 [employer]- All Rights Reserved



Reading location - IP/workstation name: [email or web address]

## 2019-08-27 NOTE — EKG REPORT
SEVERITY:- ABNORMAL ECG -

SINUS TACHYCARDIA

MULTIFORM VENTRICULAR PREMATURE COMPLEXES

NONSPECIFIC T ABNORMALITIES, INFERIOR LEADS

BORDERLINE PROLONGED QT INTERVAL

:

Confirmed by: Michael Harper MD 27-Aug-2019 06:58:51

## 2019-08-27 NOTE — RADIOLOGY REPORT (SQ)
EXAM DESCRIPTION: 



XR CHEST 1 VIEW



COMPLETED DATE/TME:  08/27/2019 00:00



CLINICAL HISTORY: 



59 years, Male, respiratory distress



COMPARISON:

8/24/2019



NUMBER OF VIEWS:

One



TECHNIQUE:

8/24/2019



LIMITATIONS:

None.



FINDINGS:



There is stable elevation of the right hemidiaphragm. There is no

focal consolidation. The heart size is stable. There is no

pneumothorax or large pleural effusion. There is no acute

fracture.



IMPRESSION:



No acute cardiopulmonary abnormality

 



copyright 2011 [employer]- All Rights Reserved

## 2019-08-28 LAB
ADD MANUAL DIFF: NO
ANION GAP SERPL CALC-SCNC: 10 MMOL/L (ref 5–19)
ANION GAP SERPL CALC-SCNC: 10 MMOL/L (ref 5–19)
BASOPHILS # BLD AUTO: 0 10^3/UL (ref 0–0.2)
BASOPHILS NFR BLD AUTO: 0.3 % (ref 0–2)
BUN SERPL-MCNC: 15 MG/DL (ref 7–20)
BUN SERPL-MCNC: 15 MG/DL (ref 7–20)
CALCIUM: 8.8 MG/DL (ref 8.4–10.2)
CALCIUM: 9 MG/DL (ref 8.4–10.2)
CHLORIDE SERPL-SCNC: 114 MMOL/L (ref 98–107)
CHLORIDE SERPL-SCNC: 116 MMOL/L (ref 98–107)
CO2 SERPL-SCNC: 25 MMOL/L (ref 22–30)
CO2 SERPL-SCNC: 25 MMOL/L (ref 22–30)
EOSINOPHIL # BLD AUTO: 0.2 10^3/UL (ref 0–0.6)
EOSINOPHIL NFR BLD AUTO: 1.2 % (ref 0–6)
ERYTHROCYTE [DISTWIDTH] IN BLOOD BY AUTOMATED COUNT: 18.9 % (ref 11.5–14)
GLUCOSE SERPL-MCNC: 125 MG/DL (ref 75–110)
GLUCOSE SERPL-MCNC: 149 MG/DL (ref 75–110)
HCT VFR BLD CALC: 33.2 % (ref 37.9–51)
HGB BLD-MCNC: 11.1 G/DL (ref 13.5–17)
LYMPHOCYTES # BLD AUTO: 0.7 10^3/UL (ref 0.5–4.7)
LYMPHOCYTES NFR BLD AUTO: 5.8 % (ref 13–45)
MCH RBC QN AUTO: 34.7 PG (ref 27–33.4)
MCHC RBC AUTO-ENTMCNC: 33.4 G/DL (ref 32–36)
MCV RBC AUTO: 104 FL (ref 80–97)
MONOCYTES # BLD AUTO: 1 10^3/UL (ref 0.1–1.4)
MONOCYTES NFR BLD AUTO: 8 % (ref 3–13)
NEUTROPHILS # BLD AUTO: 11 10^3/UL (ref 1.7–8.2)
NEUTS SEG NFR BLD AUTO: 84.7 % (ref 42–78)
PLATELET # BLD: 454 10^3/UL (ref 150–450)
POTASSIUM SERPL-SCNC: 3.4 MMOL/L (ref 3.6–5)
POTASSIUM SERPL-SCNC: 3.9 MMOL/L (ref 3.6–5)
RBC # BLD AUTO: 3.2 10^6/UL (ref 4.35–5.55)
TOTAL CELLS COUNTED % (AUTO): 100 %
VANCOMYCIN,TROUGH: 19.4 UG/ML (ref 5–20)
VITAMIN B1 (THIAMINE): 201.8 NMOL/L (ref 66.5–200)
WBC # BLD AUTO: 12.9 10^3/UL (ref 4–10.5)

## 2019-08-28 RX ADMIN — PANTOPRAZOLE SODIUM SCH MG: 40 INJECTION, POWDER, FOR SOLUTION INTRAVENOUS at 10:00

## 2019-08-28 RX ADMIN — METOPROLOL TARTRATE SCH MG: 5 INJECTION, SOLUTION INTRAVENOUS at 14:21

## 2019-08-28 RX ADMIN — VITAMIN D, TAB 1000IU (100/BT) SCH UNIT: 25 TAB at 10:04

## 2019-08-28 RX ADMIN — VANCOMYCIN HYDROCHLORIDE SCH MLS/HR: 1 INJECTION, POWDER, LYOPHILIZED, FOR SOLUTION INTRAVENOUS at 05:52

## 2019-08-28 RX ADMIN — Medication SCH ML: at 22:12

## 2019-08-28 RX ADMIN — ACETAMINOPHEN PRN MG: 325 TABLET ORAL at 14:22

## 2019-08-28 RX ADMIN — CARVEDILOL SCH MG: 3.12 TABLET, FILM COATED ORAL at 22:12

## 2019-08-28 RX ADMIN — POTASSIUM CHLORIDE SCH: 29.8 INJECTION, SOLUTION INTRAVENOUS at 12:08

## 2019-08-28 RX ADMIN — METOPROLOL TARTRATE SCH MG: 5 INJECTION, SOLUTION INTRAVENOUS at 01:51

## 2019-08-28 RX ADMIN — ACETAMINOPHEN PRN MG: 325 TABLET ORAL at 19:53

## 2019-08-28 RX ADMIN — CARVEDILOL SCH MG: 3.12 TABLET, FILM COATED ORAL at 15:35

## 2019-08-28 RX ADMIN — Medication SCH: at 05:52

## 2019-08-28 RX ADMIN — METOPROLOL TARTRATE SCH MG: 5 INJECTION, SOLUTION INTRAVENOUS at 10:00

## 2019-08-28 RX ADMIN — METOPROLOL TARTRATE SCH MG: 5 INJECTION, SOLUTION INTRAVENOUS at 05:52

## 2019-08-28 RX ADMIN — DEXTROSE PRN MLS/HR: 5 SOLUTION INTRAVENOUS at 12:05

## 2019-08-28 RX ADMIN — POTASSIUM CHLORIDE SCH MLS/HR: 29.8 INJECTION, SOLUTION INTRAVENOUS at 08:20

## 2019-08-28 RX ADMIN — TRAZODONE HYDROCHLORIDE SCH MG: 50 TABLET ORAL at 22:12

## 2019-08-28 RX ADMIN — MAGNESIUM SULFATE IN DEXTROSE SCH MLS/HR: 10 INJECTION, SOLUTION INTRAVENOUS at 08:24

## 2019-08-28 RX ADMIN — Medication SCH: at 14:23

## 2019-08-28 RX ADMIN — MAGNESIUM SULFATE IN DEXTROSE SCH MLS/HR: 10 INJECTION, SOLUTION INTRAVENOUS at 09:59

## 2019-08-28 RX ADMIN — MONTELUKAST SODIUM SCH MG: 10 TABLET, FILM COATED ORAL at 22:12

## 2019-08-28 NOTE — PDOC PROGRESS REPORT
Subjective


Progress Note for:: 08/28/19


Subjective:: 


This is a 59 yr old -American male with a past medical history of chronic

alcohol abuse and hypertension who was brought in due to confusion 5 days prior 

to admission.  He was admitted for acute renal failure, severe hypokalemia and 

hypomagnesemia and treated for possible alcohol withdrawal started on CIWA 

protocol.





8/27: Reviewed chart and ICU course.  Appears patient has not shown significant 

improvement in terms of his mental status since admission.  Upon encounter this 

morning, patient remains lethargic.  He moans but remains nonverbal.  He did get

Ativan last night around 2:30 AM.  There is minimal nuchal rigidity upon 

examination. Negative Brudzinski sign. He did grimace upon attempt to elicit a 

Kernig's sign. 





8/28: No acute event overnight.  Patient's mentation significantly improved 

after holding off on benzodiazepines and antipsychotics.  This morning, he is 

fully awake and coherent.  He is oriented x3.  There is no nuchal rigidity, Ke

rnig's or Brudzinski's signs on reassessment. No fever or chills.


Reason For Visit: 


ALCOHOL ABUSE, HYPOMAGNESIUM,HYPOCALCEMIA,HYPERTEN








Physical Exam


Vital Signs: 


                                        











Temp Pulse Resp BP Pulse Ox


 


 98.4 F   99   28 H  134/103 H  97 


 


 08/28/19 12:00  08/28/19 12:00  08/28/19 12:00  08/28/19 12:00  08/28/19 12:00








                                 Intake & Output











 08/27/19 08/28/19 08/29/19





 06:59 06:59 06:59


 


Intake Total 3453 708 709


 


Output Total 1020 81681 380


 


Balance -397 -57883 329


 


Weight 208 lb 8.917 oz 197 lb 12.074 oz 











General appearance: PRESENT: no acute distress, well-developed, well-nourished


Head exam: PRESENT: atraumatic, normocephalic


Eye exam: PRESENT: conjunctiva pink, EOMI, PERRLA.  ABSENT: scleral icterus


Ear exam: PRESENT: normal external ear exam


Mouth exam: PRESENT: moist, tongue midline


Neck exam: ABSENT: carotid bruit, JVD, lymphadenopathy, thyromegaly


Respiratory exam: PRESENT: clear to auscultation dax.  ABSENT: rales, rhonchi, 

wheezes


Cardiovascular exam: PRESENT: RRR.  ABSENT: diastolic murmur, rubs, systolic 

murmur


Pulses: PRESENT: normal dorsalis pedis pul


GI/Abdominal exam: PRESENT: normal bowel sounds, soft.  ABSENT: distended, 

guarding, mass, organolmegaly, rebound, tenderness


Rectal exam: PRESENT: deferred


Extremities exam: PRESENT: full ROM.  ABSENT: calf tenderness, clubbing, pedal 

edema


Neurological exam: PRESENT: alert, awake, oriented to person, oriented to place,

oriented to time, oriented to situation, CN II-XII grossly intact.  ABSENT: 

motor sensory deficit





Results


Laboratory Results: 


                                        





                                 08/28/19 10:30 





                                        











  08/25/19 08/28/19 08/28/19





  08:45 03:00 03:00


 


WBC   


 


RBC   


 


Hgb   


 


Hct   


 


MCV   


 


MCH   


 


MCHC   


 


RDW   


 


Plt Count   


 


Seg Neutrophils %   


 


Sodium   151.4 H 


 


Potassium   3.4 L 


 


Chloride   116 H 


 


Carbon Dioxide   25 


 


Anion Gap   10 


 


BUN   15 


 


Creatinine   0.79 


 


Est GFR ( Amer)   > 60 


 


Est GFR (Non-Af Amer)   


 


Glucose   125 H 


 


Calcium   8.8 


 


Ionized Calcium Nnamdi    1.17


 


Magnesium   1.5 L 


 


Vitamin B1  201.8 H  














  08/28/19 08/28/19





  10:30 10:30


 


WBC  12.9 H 


 


RBC  3.20 L 


 


Hgb  11.1 L 


 


Hct  33.2 L 


 


MCV  104 H 


 


MCH  34.7 H 


 


MCHC  33.4 


 


RDW  18.9 H 


 


Plt Count  454 H 


 


Seg Neutrophils %  84.7 H 


 


Sodium   Cancelled


 


Potassium   Cancelled


 


Chloride   Cancelled


 


Carbon Dioxide   Cancelled


 


Anion Gap   Cancelled


 


BUN   Cancelled


 


Creatinine   Cancelled


 


Est GFR ( Amer)   Cancelled


 


Est GFR (Non-Af Amer)   Cancelled


 


Glucose   Cancelled


 


Calcium   Cancelled


 


Ionized Calcium Nnamdi  


 


Magnesium  


 


Vitamin B1  








                                        











  08/24/19 08/24/19





  15:25 15:25


 


Creatine Kinase  948 H 


 


Troponin I   < 0.012











Impressions: 


                                        





Head CT  08/27/19 00:00


IMPRESSION:  No acute intracranial pathology.


EVIDENCE OF ACUTE STROKE: NO.


 








Abdomen Ultrasound  08/27/19 12:11


IMPRESSION:  Very limited study.  Findings as described.


 








Chest X-Ray  08/28/19 08:27


IMPRESSION:  STABLE APPEARANCE OF THE CHEST.


 














Assessment and Plan





- Diagnosis


(1) Acute encephalopathy


Is this a current diagnosis for this admission?: Yes   


Plan: 


8/27: He was treated for possible DT. Appears patient has not shown significant 

improvement in terms of his mental status since admission.  Upon encounter this 

morning, patient remains lethargic.  He moans but remains nonverbal.  He did get

Ativan last night around 2:30 AM.  There is minimal nuchal rigidity upon 

examination. Negative Brudzinski sign. He did grimace upon attempt to elicit a 

Kernig's sign. 





Etiology is multifactorial. Differentials include alcohol withdrawal, metabolic,

medications and possibly infectious. His sodium today is 150 but previous Na 

levels is not severe enough to cause his encephaloapthy. Hold off on Ativan for 

now. With his leukocytosis, low grade fevers and equivocal meningeal signs, will

consider LP if he continues to be lethargic despite being off benzos. Will order

for a head CT as well.








8/28: Resolving. Patient's mentation significantly improved after holding off on

benzodiazepines and antipsychotics. Acute encephalopathy was likely from a 

combination of alcohol withdrawal, EDMAR and medication effect.








(2) Acute kidney injury


Is this a current diagnosis for this admission?: Yes   


Plan: 


Resolved with IV fluids.








(3) Hypernatremia


Is this a current diagnosis for this admission?: Yes   


Plan: 


8/27: Continue D5 half saline. Will check a serum osm.





8/28: Switch to D5W.








(4) Hypokalemia


Is this a current diagnosis for this admission?: Yes   


Plan: 


Repleted.








(5) Hypomagnesemia


Is this a current diagnosis for this admission?: Yes   


Plan: 


Repleted.








(6) Alcohol abuse


Is this a current diagnosis for this admission?: Yes   


Plan: 


Counseled on alcohol cessation.

## 2019-08-28 NOTE — RADIOLOGY REPORT (SQ)
EXAM DESCRIPTION:  CHEST SINGLE VIEW



COMPLETED DATE/TIME:  8/28/2019 8:50 am



REASON FOR STUDY:  rales



COMPARISON:  8/7/2019



NUMBER OF VIEWS:  One view.



TECHNIQUE:  Single frontal radiographic image of the chest acquired.



LIMITATIONS:  None.



FINDINGS:  LUNGS AND PLEURA: Stable appearance.

MEDIASTINUM AND HILAR STRUCTURES: Stable heart size and mediastinal structures.

HEART AND VASCULAR STRUCTURES:  Stable appearance.

BONES: No acute findings.

HARDWARE: None in the chest.

OTHER: No other significant finding.



IMPRESSION:  STABLE APPEARANCE OF THE CHEST.



TECHNICAL DOCUMENTATION:  JOB ID:  [id number]

 2011 [employer]- All Rights Reserved



Reading location - IP/workstation name: [email or web address]

## 2019-08-29 LAB
ANION GAP SERPL CALC-SCNC: 10 MMOL/L (ref 5–19)
BUN SERPL-MCNC: 13 MG/DL (ref 7–20)
CALCIUM: 8.6 MG/DL (ref 8.4–10.2)
CHLORIDE SERPL-SCNC: 111 MMOL/L (ref 98–107)
CO2 SERPL-SCNC: 24 MMOL/L (ref 22–30)
GLUCOSE SERPL-MCNC: 112 MG/DL (ref 75–110)
POTASSIUM SERPL-SCNC: 3.4 MMOL/L (ref 3.6–5)

## 2019-08-29 RX ADMIN — PANTOPRAZOLE SODIUM SCH: 40 TABLET, DELAYED RELEASE ORAL at 06:51

## 2019-08-29 RX ADMIN — TRAZODONE HYDROCHLORIDE SCH MG: 50 TABLET ORAL at 21:35

## 2019-08-29 RX ADMIN — ACETAMINOPHEN PRN MG: 325 TABLET ORAL at 21:35

## 2019-08-29 RX ADMIN — CARVEDILOL SCH MG: 3.12 TABLET, FILM COATED ORAL at 11:12

## 2019-08-29 RX ADMIN — Medication SCH: at 21:37

## 2019-08-29 RX ADMIN — DEXTROSE PRN MLS/HR: 5 SOLUTION INTRAVENOUS at 21:36

## 2019-08-29 RX ADMIN — MONTELUKAST SODIUM SCH MG: 10 TABLET, FILM COATED ORAL at 21:35

## 2019-08-29 RX ADMIN — Medication SCH: at 06:51

## 2019-08-29 RX ADMIN — Medication SCH MG: at 11:12

## 2019-08-29 RX ADMIN — VITAMIN D, TAB 1000IU (100/BT) SCH UNIT: 25 TAB at 11:12

## 2019-08-29 RX ADMIN — DEXTROSE PRN MLS/HR: 5 SOLUTION INTRAVENOUS at 08:06

## 2019-08-29 RX ADMIN — CARVEDILOL SCH MG: 3.12 TABLET, FILM COATED ORAL at 21:35

## 2019-08-29 RX ADMIN — Medication SCH: at 14:12

## 2019-08-29 NOTE — PDOC PROGRESS REPORT
Subjective


Progress Note for:: 08/29/19


Subjective:: 


This is a 59 yr old -American male with a past medical history of chronic

alcohol abuse and hypertension who was brought in due to confusion 5 days prior 

to admission.  He was admitted for acute renal failure, severe hypokalemia and 

hypomagnesemia and treated for possible alcohol withdrawal started on CIWA 

protocol.





8/27: Reviewed chart and ICU course.  Appears patient has not shown significant 

improvement in terms of his mental status since admission.  Upon encounter this 

morning, patient remains lethargic.  He moans but remains nonverbal.  He did get

Ativan last night around 2:30 AM.  There is minimal nuchal rigidity upon 

examination. Negative Brudzinski sign. He did grimace upon attempt to elicit a 

Kernig's sign. 





8/28: Patient's mentation significantly improved after holding off on 

benzodiazepines and antipsychotics.  This morning, he is fully awake and 

coherent.  He is oriented x3.  There is no nuchal rigidity, Kernig's or 

Brudzinski's signs on reassessment. No fever or chills.





8/29: No acute event overnight.  His mental status continue to improve.  He is 

very conversant and well oriented this morning.  Denies any acute complaints.  


Reason For Visit: 


ALCOHOL ABUSE, HYPOMAGNESIUM,HYPOCALCEMIA,HYPERTEN








Physical Exam


Vital Signs: 


                                        











Temp Pulse Resp BP Pulse Ox


 


 99.0 F   110 H  22 H  140/109 H  99 


 


 08/29/19 12:00  08/29/19 12:00  08/29/19 14:01  08/29/19 14:01  08/29/19 14:01








                                 Intake & Output











 08/28/19 08/29/19 08/30/19





 06:59 06:59 06:59


 


Intake Total 708 1459 1000


 


Output Total 32351 1070 250


 


Balance -92175 389 750


 


Weight 197 lb 12.074 oz 203 lb 14.841 oz 











General appearance: PRESENT: no acute distress, well-developed, well-nourished


Head exam: PRESENT: atraumatic, normocephalic


Eye exam: PRESENT: conjunctiva pink, EOMI, PERRLA.  ABSENT: scleral icterus


Ear exam: PRESENT: normal external ear exam


Mouth exam: PRESENT: moist, tongue midline


Neck exam: ABSENT: carotid bruit, JVD, lymphadenopathy, thyromegaly


Respiratory exam: PRESENT: clear to auscultation dax.  ABSENT: rales, rhonchi, 

wheezes


Cardiovascular exam: PRESENT: RRR.  ABSENT: diastolic murmur, rubs, systolic 

murmur


Pulses: PRESENT: normal dorsalis pedis pul


GI/Abdominal exam: PRESENT: normal bowel sounds, soft.  ABSENT: distended, 

guarding, mass, organolmegaly, rebound, tenderness


Rectal exam: PRESENT: deferred


Neurological exam: PRESENT: alert, awake, oriented to person, oriented to place,

oriented to time, CN II-XII grossly intact.  ABSENT: motor sensory deficit





Results


Laboratory Results: 


                                        





                                 08/28/19 10:30 





                                 08/29/19 03:53 





                                        











  08/28/19 08/29/19 08/29/19





  13:50 03:53 03:53


 


Sodium  148.5 H  145.4 H 


 


Potassium  3.9  3.4 L 


 


Chloride  114 H  111 H 


 


Carbon Dioxide  25  24 


 


Anion Gap  10  10 


 


BUN  15  13 


 


Creatinine  0.80  0.66 


 


Est GFR ( Amer)  > 60  > 60 


 


Glucose  149 H  112 H 


 


Calcium  9.0  8.6 


 


Ionized Calcium Nnamdi    1.16


 


Magnesium   1.7 








                                        











  08/24/19 08/24/19





  15:25 15:25


 


Creatine Kinase  948 H 


 


Troponin I   < 0.012











Impressions: 


                                        





Head CT  08/27/19 00:00


IMPRESSION:  No acute intracranial pathology.


EVIDENCE OF ACUTE STROKE: NO.


 








Abdomen Ultrasound  08/27/19 12:11


IMPRESSION:  Very limited study.  Findings as described.


 








Chest X-Ray  08/28/19 08:27


IMPRESSION:  STABLE APPEARANCE OF THE CHEST.


 














Assessment and Plan





- Diagnosis


(1) Acute encephalopathy


Is this a current diagnosis for this admission?: Yes   


Plan: 


8/27: He was treated for possible DT. Appears patient has not shown significant 

improvement in terms of his mental status since admission.  Upon encounter this 

morning, patient remains lethargic.  He moans but remains nonverbal.  He did get

Ativan last night around 2:30 AM.  There is minimal nuchal rigidity upon e

xamination. Negative Brudzinski sign. He did grimace upon attempt to elicit a 

Kernig's sign. 





Etiology is multifactorial. Differentials include alcohol withdrawal, metabolic,

medications and possibly infectious. His sodium today is 150 but previous Na 

levels is not severe enough to cause his encephaloapthy. Hold off on Ativan for 

now. With his leukocytosis, low grade fevers and equivocal meningeal signs, will

consider LP if he continues to be lethargic despite being off benzos. Will order

for a head CT as well.








8/28: Resolving. Patient's mentation significantly improved after holding off on

benzodiazepines and antipsychotics. Acute encephalopathy was likely from a 

combination of alcohol withdrawal, EDMAR and medication effect.





8/29: Resolved.








(2) Acute kidney injury


Is this a current diagnosis for this admission?: Yes   


Plan: 


Resolved with IV fluids.








(3) Hypernatremia


Is this a current diagnosis for this admission?: Yes   


Plan: 


8/27: Continue D5 half saline. Will check a serum osm.





8/28: Switch to D5W.








(4) Hypokalemia


Is this a current diagnosis for this admission?: Yes   


Plan: 


Repleted.








(5) Hypomagnesemia


Is this a current diagnosis for this admission?: Yes   


Plan: 


Repleted.








(6) Alcohol abuse


Is this a current diagnosis for this admission?: Yes   


Plan: 


Counseled on alcohol cessation.








- Time


Time Spent with patient: 25-34 minutes

## 2019-08-30 VITALS — DIASTOLIC BLOOD PRESSURE: 101 MMHG | SYSTOLIC BLOOD PRESSURE: 136 MMHG

## 2019-08-30 RX ADMIN — Medication SCH: at 15:14

## 2019-08-30 RX ADMIN — VITAMIN D, TAB 1000IU (100/BT) SCH UNIT: 25 TAB at 10:24

## 2019-08-30 RX ADMIN — CARVEDILOL SCH MG: 3.12 TABLET, FILM COATED ORAL at 10:24

## 2019-08-30 RX ADMIN — PANTOPRAZOLE SODIUM SCH: 40 TABLET, DELAYED RELEASE ORAL at 05:25

## 2019-08-30 RX ADMIN — Medication SCH MG: at 10:24

## 2019-08-30 RX ADMIN — Medication SCH: at 05:25

## 2019-08-30 RX ADMIN — DEXTROSE PRN MLS/HR: 5 SOLUTION INTRAVENOUS at 12:41

## 2019-08-31 NOTE — PDOC DISCHARGE SUMMARY
General





- Admit/Disc Date/PCP


Admission Date/Primary Care Provider: 


  08/24/19 16:36





  





Discharge Date: 08/30/19





- Discharge Diagnosis


(1) Acute encephalopathy


Is this a current diagnosis for this admission?: Yes   





(2) Acute kidney injury


Is this a current diagnosis for this admission?: Yes   





(3) Hypernatremia


Is this a current diagnosis for this admission?: Yes   





(4) Hypokalemia


Is this a current diagnosis for this admission?: Yes   





(5) Hypomagnesemia


Is this a current diagnosis for this admission?: Yes   





(6) Alcohol abuse


Is this a current diagnosis for this admission?: Yes   





- Additional Information


Resuscitation Status: Full Code


Discharge Diet: Regular


Prescriptions: 


Potassium Chloride [Klor-Con 10 Meq Capsule ER] 20 meq PO BID #20 cap


Home Medications: 








Carvedilol [Coreg 3.125 mg Tablet] 3.125 mg PO Q12 08/25/19 


Doxepin HCl [Sinequan 25 mg Capsule] 25 mg PO DAILY 08/25/19 


Furosemide [Lasix 40 mg Tablet] 40 mg PO DAILY 08/25/19 


Montelukast Sodium [Singulair 10 mg Tablet] 10 mg PO QHS 08/25/19 


Tizanidine HCl [Zanaflex 4 mg Tablet] 4 mg PO QHS 08/25/19 


Trazodone HCl [Desyrel 50 mg Tablet] 50 mg PO QHS 08/25/19 


Potassium Chloride [Klor-Con 10 Meq Capsule ER] 20 meq PO BID #20 cap 08/30/19 











History of Present Illness


History of Present Illness: 


Admitting hospitalist's H&P:





Mr. Castro is a 59-year-old black male who comes in with altered mental status, 

confusion 5 days.  Cording to the wife the patient charted having these problems

on Monday, Tuesday he went to his primary care doctor and had lab work drawn.  

On Wednesday his primary care provider called and said that he need to start 

taking potassium that his potassium was low into the medications I have reviewed

he was 20 mEq daily.  He states that in the last day or 2 his confusion is 

gotten worse, also he started having tremors and shaking all over.  Cording to 

the wife and the daughter this in the room he is never had this type of behavior

before.





Patient has a long history of alcohol abuse.  The patient is able to answer me 

and states that he drinks 2 fifths of Gin gin per day, and has done so for a 

number of years. Approximately 1 year ago the patient was 2 months then for 2 

months when he got out of rehab he was sober.  After that however he started d

rinking heavily again.





Hospital Course


Hospital Course: 


This is a 59 yr old -American male with a past medical history of chronic

alcohol abuse and hypertension who was brought in due to confusion 5 days prior 

to admission.  





He was admitted for acute renal failure, severe hypokalemia and hypomagnesemia 

and treated for possible alcohol withdrawal started on CIWA protocol.





He was also treated for hypernatremia. He returned to his baseline. He was 

counseled in length about alcohol cessation.





His hypokalemia and hypomagnesemia resolved. His EDMAR also resolved.








Physical Exam


Vital Signs: 


                                        











Temp Pulse Resp BP Pulse Ox


 


 97.9 F   107 H  18   112/88 H  100 


 


 08/30/19 16:21  08/30/19 16:21  08/30/19 16:21  08/30/19 16:21  08/30/19 16:21








                                 Intake & Output











 08/29/19 08/30/19 08/31/19





 06:59 06:59 06:59


 


Intake Total 1459 2519 994


 


Output Total 1070 275 


 


Balance 389 2244 994


 


Weight 203 lb 14.841 oz 209 lb 7.026 oz 











General appearance: PRESENT: no acute distress, well-developed, well-nourished


Head exam: PRESENT: atraumatic, normocephalic


Eye exam: PRESENT: conjunctiva pink, EOMI, PERRLA.  ABSENT: scleral icterus


Ear exam: PRESENT: normal external ear exam


Mouth exam: PRESENT: moist, tongue midline


Neck exam: ABSENT: carotid bruit, JVD, lymphadenopathy, thyromegaly


Respiratory exam: PRESENT: clear to auscultation dax.  ABSENT: rales, rhonchi, 

wheezes


Cardiovascular exam: PRESENT: RRR.  ABSENT: diastolic murmur, rubs, systolic 

murmur


Pulses: PRESENT: normal dorsalis pedis pul


GI/Abdominal exam: PRESENT: normal bowel sounds, soft.  ABSENT: distended, 

guarding, mass, organolmegaly, rebound, tenderness


Rectal exam: PRESENT: deferred


Neurological exam: PRESENT: alert, awake, oriented to person, oriented to place,

oriented to time, oriented to situation, CN II-XII grossly intact.  ABSENT: 

motor sensory deficit





Results


Laboratory Results: 


                                        





                                 08/28/19 10:30 





                                 08/30/19 10:10 





                                        











  08/30/19 08/30/19 08/30/19





  03:50 05:36 10:10


 


Potassium    3.4 L


 


Ionized Calcium Nnamdi  Cancelled  1.11 L 








                                        











  08/24/19 08/24/19





  15:25 15:25


 


Creatine Kinase  948 H 


 


Troponin I   < 0.012











Impressions: 


                                        





Head CT  08/27/19 00:00


IMPRESSION:  No acute intracranial pathology.


EVIDENCE OF ACUTE STROKE: NO.


 








Abdomen Ultrasound  08/27/19 12:11


IMPRESSION:  Very limited study.  Findings as described.


 








Chest X-Ray  08/28/19 08:27


IMPRESSION:  STABLE APPEARANCE OF THE CHEST.


 














Qualifiers





- *


PATIENT BEING DISCHARGED WITH ANY OF THE FOLLOWING DIAGNOSIS: No





Acute Heart Failure





- **


Is this a Heart Failure Patient?: No


LVEF < 40%?: No- if no continue to question #3


3. Anticoagulant therapy for permanect/persistent/paraoxysmal Afib or Aflutter: 

N/A

## 2019-11-23 ENCOUNTER — HOSPITAL ENCOUNTER (INPATIENT)
Dept: HOSPITAL 62 - ER | Age: 59
LOS: 1 days | Discharge: HOME | DRG: 897 | End: 2019-11-24
Attending: FAMILY MEDICINE | Admitting: FAMILY MEDICINE
Payer: OTHER GOVERNMENT

## 2019-11-23 DIAGNOSIS — Z82.49: ICD-10-CM

## 2019-11-23 DIAGNOSIS — E83.42: ICD-10-CM

## 2019-11-23 DIAGNOSIS — J44.9: ICD-10-CM

## 2019-11-23 DIAGNOSIS — M19.90: ICD-10-CM

## 2019-11-23 DIAGNOSIS — F43.10: ICD-10-CM

## 2019-11-23 DIAGNOSIS — F10.239: Primary | ICD-10-CM

## 2019-11-23 DIAGNOSIS — E87.6: ICD-10-CM

## 2019-11-23 DIAGNOSIS — I50.9: ICD-10-CM

## 2019-11-23 DIAGNOSIS — I11.0: ICD-10-CM

## 2019-11-23 DIAGNOSIS — I42.9: ICD-10-CM

## 2019-11-23 DIAGNOSIS — R56.9: ICD-10-CM

## 2019-11-23 DIAGNOSIS — Z71.41: ICD-10-CM

## 2019-11-23 DIAGNOSIS — E83.51: ICD-10-CM

## 2019-11-23 DIAGNOSIS — Z87.891: ICD-10-CM

## 2019-11-23 LAB
ADD MANUAL DIFF: NO
ALBUMIN SERPL-MCNC: 2.6 G/DL (ref 3.5–5)
ALP SERPL-CCNC: 55 U/L (ref 38–126)
ANION GAP SERPL CALC-SCNC: 13 MMOL/L (ref 5–19)
ANION GAP SERPL CALC-SCNC: 14 MMOL/L (ref 5–19)
APPEARANCE UR: (no result)
APTT PPP: (no result) S
AST SERPL-CCNC: 31 U/L (ref 17–59)
BARBITURATES UR QL SCN: NEGATIVE
BASOPHILS # BLD AUTO: 0 10^3/UL (ref 0–0.2)
BASOPHILS NFR BLD AUTO: 0.1 % (ref 0–2)
BILIRUB DIRECT SERPL-MCNC: 0.3 MG/DL (ref 0–0.4)
BILIRUB SERPL-MCNC: 1.2 MG/DL (ref 0.2–1.3)
BILIRUB UR QL STRIP: NEGATIVE
BUN SERPL-MCNC: 10 MG/DL (ref 7–20)
BUN SERPL-MCNC: 8 MG/DL (ref 7–20)
CALCIUM: 5.8 MG/DL (ref 8.4–10.2)
CALCIUM: 8.5 MG/DL (ref 8.4–10.2)
CHLORIDE SERPL-SCNC: 110 MMOL/L (ref 98–107)
CHLORIDE SERPL-SCNC: 95 MMOL/L (ref 98–107)
CO2 SERPL-SCNC: 14 MMOL/L (ref 22–30)
CO2 SERPL-SCNC: 23 MMOL/L (ref 22–30)
EOSINOPHIL # BLD AUTO: 0 10^3/UL (ref 0–0.6)
EOSINOPHIL NFR BLD AUTO: 0.4 % (ref 0–6)
ERYTHROCYTE [DISTWIDTH] IN BLOOD BY AUTOMATED COUNT: 16.7 % (ref 11.5–14)
ETHANOL SERPL-MCNC: < 10 MG/DL
GLUCOSE SERPL-MCNC: 104 MG/DL (ref 75–110)
GLUCOSE SERPL-MCNC: 155 MG/DL (ref 75–110)
GLUCOSE UR STRIP-MCNC: NEGATIVE MG/DL
HCT VFR BLD CALC: 51.3 % (ref 37.9–51)
HGB BLD-MCNC: 17.5 G/DL (ref 13.5–17)
KETONES UR STRIP-MCNC: (no result) MG/DL
LYMPHOCYTES # BLD AUTO: 1.9 10^3/UL (ref 0.5–4.7)
LYMPHOCYTES NFR BLD AUTO: 14.7 % (ref 13–45)
MCH RBC QN AUTO: 32.6 PG (ref 27–33.4)
MCHC RBC AUTO-ENTMCNC: 34.1 G/DL (ref 32–36)
MCV RBC AUTO: 95 FL (ref 80–97)
METHADONE UR QL SCN: NEGATIVE
MONOCYTES # BLD AUTO: 0.9 10^3/UL (ref 0.1–1.4)
MONOCYTES NFR BLD AUTO: 6.7 % (ref 3–13)
NEUTROPHILS # BLD AUTO: 10.2 10^3/UL (ref 1.7–8.2)
NEUTS SEG NFR BLD AUTO: 78.1 % (ref 42–78)
NITRITE UR QL STRIP: NEGATIVE
PCP UR QL SCN: NEGATIVE
PH UR STRIP: 6 [PH] (ref 5–9)
PLATELET # BLD: 235 10^3/UL (ref 150–450)
POTASSIUM SERPL-SCNC: 2.2 MMOL/L (ref 3.6–5)
POTASSIUM SERPL-SCNC: 3.2 MMOL/L (ref 3.6–5)
PROT SERPL-MCNC: 5.4 G/DL (ref 6.3–8.2)
PROT UR STRIP-MCNC: 100 MG/DL
RBC # BLD AUTO: 5.38 10^6/UL (ref 4.35–5.55)
SP GR UR STRIP: 1.02
TOTAL CELLS COUNTED % (AUTO): 100 %
URINE AMPHETAMINES SCREEN: NEGATIVE
URINE BENZODIAZEPINES SCREEN: (no result)
URINE COCAINE SCREEN: NEGATIVE
URINE MARIJUANA (THC) SCREEN: NEGATIVE
UROBILINOGEN UR-MCNC: NEGATIVE MG/DL (ref ?–2)
WBC # BLD AUTO: 13 10^3/UL (ref 4–10.5)

## 2019-11-23 PROCEDURE — 71045 X-RAY EXAM CHEST 1 VIEW: CPT

## 2019-11-23 PROCEDURE — 81001 URINALYSIS AUTO W/SCOPE: CPT

## 2019-11-23 PROCEDURE — 85027 COMPLETE CBC AUTOMATED: CPT

## 2019-11-23 PROCEDURE — 96375 TX/PRO/DX INJ NEW DRUG ADDON: CPT

## 2019-11-23 PROCEDURE — 83735 ASSAY OF MAGNESIUM: CPT

## 2019-11-23 PROCEDURE — 93005 ELECTROCARDIOGRAM TRACING: CPT

## 2019-11-23 PROCEDURE — 84484 ASSAY OF TROPONIN QUANT: CPT

## 2019-11-23 PROCEDURE — 93010 ELECTROCARDIOGRAM REPORT: CPT

## 2019-11-23 PROCEDURE — 80053 COMPREHEN METABOLIC PANEL: CPT

## 2019-11-23 PROCEDURE — 99285 EMERGENCY DEPT VISIT HI MDM: CPT

## 2019-11-23 PROCEDURE — 96365 THER/PROPH/DIAG IV INF INIT: CPT

## 2019-11-23 PROCEDURE — 80307 DRUG TEST PRSMV CHEM ANLYZR: CPT

## 2019-11-23 PROCEDURE — 80048 BASIC METABOLIC PNL TOTAL CA: CPT

## 2019-11-23 PROCEDURE — 36415 COLL VENOUS BLD VENIPUNCTURE: CPT

## 2019-11-23 PROCEDURE — 70450 CT HEAD/BRAIN W/O DYE: CPT

## 2019-11-23 PROCEDURE — HZ2ZZZZ DETOXIFICATION SERVICES FOR SUBSTANCE ABUSE TREATMENT: ICD-10-PCS | Performed by: FAMILY MEDICINE

## 2019-11-23 PROCEDURE — 96361 HYDRATE IV INFUSION ADD-ON: CPT

## 2019-11-23 PROCEDURE — 85025 COMPLETE CBC W/AUTO DIFF WBC: CPT

## 2019-11-23 RX ADMIN — HEPARIN SODIUM SCH UNIT: 5000 INJECTION, SOLUTION INTRAVENOUS; SUBCUTANEOUS at 21:27

## 2019-11-23 RX ADMIN — DIAZEPAM PRN MG: 5 INJECTION, SOLUTION INTRAMUSCULAR; INTRAVENOUS at 10:45

## 2019-11-23 RX ADMIN — MAGNESIUM SULFATE IN DEXTROSE SCH MLS/HR: 10 INJECTION, SOLUTION INTRAVENOUS at 08:00

## 2019-11-23 RX ADMIN — MAGNESIUM SULFATE IN DEXTROSE SCH MLS/HR: 10 INJECTION, SOLUTION INTRAVENOUS at 08:43

## 2019-11-23 RX ADMIN — HEPARIN SODIUM SCH UNIT: 5000 INJECTION, SOLUTION INTRAVENOUS; SUBCUTANEOUS at 15:28

## 2019-11-23 RX ADMIN — DIAZEPAM PRN MG: 5 INJECTION, SOLUTION INTRAMUSCULAR; INTRAVENOUS at 21:27

## 2019-11-23 NOTE — RADIOLOGY REPORT (SQ)
EXAM DESCRIPTION:

CT HEAD WITHOUT IV CONTRAST



COMPLETED DATE/TME:  11/23/2019 05:17



CLINICAL HISTORY:

59 years Male, ams, seizure activity



COMPARISON:

8/27/19



TECHNIQUE:  No contrast.  Coronal and sagittal reformat.  This

exam was performed according to our departmental

dose-optimization program, which includes automated exposure

control, adjustment of the mA and/or kV according to patient size

and/or use of iterative reconstruction technique.

Limitation: Motion.



FINDINGS:  No hemorrhage or infarct. No mass, mass effect, or

midline shift. Atherosclerosis.  Brain and extra-axial structures

appear otherwise intact.



IMPRESSION: No acute findings.

## 2019-11-23 NOTE — RADIOLOGY REPORT (SQ)
EXAM DESCRIPTION:

XR CHEST 1 VIEW



COMPLETED DATE/TME:  11/23/2019 05:17



CLINICAL HISTORY:

59 years Male, ams, seizure activity



COMPARISON:

8/28/19, August 24, 2019



NUMBER OF VIEWS/TECHNIQUE:

1/AP 



FINDINGS:

Moderate chronic elevation of the right hemidiaphragm.

Adequate lung volume, clear parenchyma, normal cardiac

silhouette, and cervical spinal hardware.



IMPRESSION:



No acute cardiopulmonary findings.

## 2019-11-23 NOTE — EKG REPORT
SEVERITY:- ABNORMAL ECG -

SINUS TACHYCARDIA

CONSIDER ANTEROSEPTAL INFARCT

BORDERLINE T ABNORMALITIES, INFERIOR LEADS

:

Confirmed by: Michael Harper MD 23-Nov-2019 17:22:23

## 2019-11-23 NOTE — ER DOCUMENT REPORT
ED General





- General


Mode of Arrival: Medic


Information source: Emergency Med Personnel


TRAVEL OUTSIDE OF THE [place] IN LAST 30 DAYS: No





- Related Data


Home Medications: pt states he does not know his meds or doses.





<EDER MORE IV - Last Filed: 19 06:06>





<ABELARDO ALVAREZ - Last Filed: 19 08:45>





- General


Chief Complaint: Altered Mental Status


Stated Complaint: UNRESPONSIVE


Time Seen by Provider: 19 05:04





- HPI


Notes: 





Patient is a 59-year-old male who presents via rescue after having a reported 

episode of seizure activity at his home.  No family member is present with the 

patient at the time of initial evaluation by this MD.  Patient states he does 

not remember what happened or why he is in the hospital.  Last thing he 

remembers is just sitting at home and then after that he remembers being in the 

back of the ambulance but he does not remember losing consciousness.  EMS 

reported that the patient appeared to be postictal upon their arrival, had some 

confusion but by the time he arrived to the ED he was able to move himself off 

the EMS stretcher onto the bed.  Review of the medical record shows that the 

patient has a history of alcohol abuse, acute encephalopathy, hyponatremia, 

acute kidney injury, hypokalemia. (EDER MORE IV)





- Related Data


Allergies/Adverse Reactions: 


                                        





No Known Allergies Allergy (Verified 17 05:01)


   











Past Medical History





- General


Information source: Patient, Emergency Med Personnel





- Social History


Smoking Status: Former Smoker


Chew tobacco use (# tins/day): No


Frequency of alcohol use: Heavy


Drug Abuse: None


Family History: Hypertension, Other - Mom  age 74 Alzheimer's


Patient has suicidal ideation: No


Patient has homicidal ideation: No





- Past Medical History


Cardiac Medical History: Reports: Hx Congestive Heart Failure - Cardiomyopathy, 

Hx Hypertension


   Denies: Hx Coronary Artery Disease, Hx Heart Attack


Pulmonary Medical History: Reports: Hx COPD


   Denies: Hx Asthma, Hx Bronchitis, Hx Pneumonia


Neurological Medical History: Reports: Hx Seizures - Secondary to alcohol.  D

enies: Hx Cerebrovascular Accident


Renal/ Medical History: Denies: Hx Peritoneal Dialysis


Musculoskeletal Medical History: Reports Hx Arthritis


Psychiatric Medical History: Reports: Hx Post Traumatic Stress Disorder


   Denies: Hx Depression


Past Surgical History: Reports: Hx Orthopedic Surgery - neck





- Immunizations


Hx Diphtheria, Pertussis, Tetanus Vaccination: No





<EDER MORE IV - Last Filed: 19 06:06>





Review of Systems





- Review of Systems


Constitutional: No symptoms reported


EENT: No symptoms reported


Cardiovascular: No symptoms reported


Respiratory: No symptoms reported


Gastrointestinal: No symptoms reported


Genitourinary: No symptoms reported


Male Genitourinary: No symptoms reported


Musculoskeletal: No symptoms reported


Skin: No symptoms reported


Hematologic/Lymphatic: No symptoms reported


Neurological/Psychological: Other - Reported seizure activity, altered mental 

status


-: Yes All other systems reviewed and negative





<EDER MORE IV - Last Filed: 19 06:06>





Physical Exam





<EDER MORE IV - Last Filed: 19 06:06>





- Vital signs


Vitals: 


                                        











Resp Pulse Ox


 


 25 H  93 


 


 19 04:32  19 04:32














- Notes


Notes: 





PHYSICAL EXAMINATION:


 


GENERAL: Well-appearing, well-nourished and in no acute distress.


 


HEAD: Atraumatic, normocephalic.


 


EYES: Pupils equal round and reactive to light, extraocular movements intact, 

sclera anicteric, conjunctiva are normal.


 


ENT: nares patent, oropharynx clear without exudates.  Moist mucous membranes.


 


NECK: Normal range of motion, supple without lymphadenopathy


 


LUNGS: Breath sounds clear to auscultation bilaterally and equal.  No wheezes 

rales or rhonchi.


 


HEART: Regular rate and rhythm without murmurs


 


ABDOMEN: Soft, nontender, normoactive bowel sounds.  No guarding, no rebound.  

No masses appreciated.


 


EXTREMITIES: Normal range of motion, no pitting or edema.  No cyanosis.


 


NEUROLOGICAL: No focal neurological deficits. Moves all extremities 

spontaneously and on command.


 


PSYCH: Normal mood, normal affect.


 


SKIN: Warm, Dry, normal turgor, no rashes or lesions noted. (DERIKEDER CANTOR IV)





Course





- Laboratory


Result Diagrams: 


                                 19 04:43





                                 19 04:43





- Transfer of Care


Care transferred to following provider: dr. alvarez at 0600 





<EDER MORE IV - Last Filed: 19 06:06>





- Laboratory


Result Diagrams: 


                                 19 04:43





                                 19 04:43





<ABELARDO ALVAREZ - Last Filed: 19 08:45>





- Vital Signs


Vital signs: 


                                        











Temp Pulse Resp BP Pulse Ox


 


 98.1 F   119 H  21 H  138/103 H  94 


 


 19 04:34  19 04:34  19 07:01  19 07:01  19 07:01














- Laboratory


Laboratory results interpreted by me: 


                                        











  19





  04:43 04:43 04:43


 


WBC  13.0 H  


 


Hgb  17.5 H  


 


Hct  51.3 H  


 


RDW  16.7 H  


 


Absolute Neuts (auto)  10.2 H  


 


Seg Neutrophils %  78.1 H  


 


Potassium   2.2 L* 


 


Chloride   110 H 


 


Carbon Dioxide   14 L 


 


Glucose   155 H 


 


Calcium   5.8 L* 


 


Magnesium    1.0 L*


 


Total Protein   5.4 L 


 


Albumin   2.6 L 














- EKG Interpretation by Me


Additional EKG results interpreted by me: 





19 05:28


EKG performed on 2019 at 0525 hrs. was interpreted by this MD.  Findings: 

Sinus tachycardia, rate 117, normal axis, P waves preceding QRS complexes, QRS 

complexes appear narrow, ST segments are non-specific.  Impression sinus 

tachycardia with nonspecific ST segments. (EDER MORE IV)





Discharge





<EDER MORE IV - Last Filed: 19 06:06>





- Discharge


Admitting Provider: Patricia (Hospitalist)


Unit Admitted: [facility]





<ABELARDO ALVAREZ - Last Filed: 19 08:45>





- Discharge


Clinical Impression: 


 Seizure, Hypokalemia, Hypocalcemia, Hypomagnesemia, Tachycardia





Altered mental status, unspecified


Qualifiers:


 Altered mental status type: unspecified Qualified Code(s): R41.82 - Altered 

mental status, unspecified





Alcohol dependency


Qualifiers:


 Substance use status: other alcohol-induced disorder Qualified Code(s): F10.288

- Alcohol dependence with other alcohol-induced disorder





Condition: Stable


Disposition: ADMITTED AS INPATIENT

## 2019-11-24 VITALS — DIASTOLIC BLOOD PRESSURE: 96 MMHG | SYSTOLIC BLOOD PRESSURE: 123 MMHG

## 2019-11-24 LAB
ANION GAP SERPL CALC-SCNC: 13 MMOL/L (ref 5–19)
BUN SERPL-MCNC: 10 MG/DL (ref 7–20)
CALCIUM: 9 MG/DL (ref 8.4–10.2)
CHLORIDE SERPL-SCNC: 104 MMOL/L (ref 98–107)
CO2 SERPL-SCNC: 19 MMOL/L (ref 22–30)
ERYTHROCYTE [DISTWIDTH] IN BLOOD BY AUTOMATED COUNT: 16.6 % (ref 11.5–14)
GLUCOSE SERPL-MCNC: 98 MG/DL (ref 75–110)
HCT VFR BLD CALC: 45.1 % (ref 37.9–51)
HGB BLD-MCNC: 15.5 G/DL (ref 13.5–17)
MCH RBC QN AUTO: 32.7 PG (ref 27–33.4)
MCHC RBC AUTO-ENTMCNC: 34.3 G/DL (ref 32–36)
MCV RBC AUTO: 95 FL (ref 80–97)
PLATELET # BLD: 155 10^3/UL (ref 150–450)
POTASSIUM SERPL-SCNC: 3.5 MMOL/L (ref 3.6–5)
RBC # BLD AUTO: 4.73 10^6/UL (ref 4.35–5.55)
WBC # BLD AUTO: 9.2 10^3/UL (ref 4–10.5)

## 2019-11-24 RX ADMIN — DIAZEPAM PRN MG: 5 INJECTION, SOLUTION INTRAMUSCULAR; INTRAVENOUS at 01:41

## 2019-11-24 RX ADMIN — HEPARIN SODIUM SCH UNIT: 5000 INJECTION, SOLUTION INTRAVENOUS; SUBCUTANEOUS at 05:22

## 2019-11-24 RX ADMIN — HEPARIN SODIUM SCH: 5000 INJECTION, SOLUTION INTRAVENOUS; SUBCUTANEOUS at 07:07

## 2019-11-24 NOTE — PDOC H&P
History of Present Illness


Admission Date/PCP: 


  19 08:51





  





History of Present Illness: 


KRISTA DAILY is a 59 year old male with a history of alcoholism has had alcohol-

related withdrawal seizures in the past who presents with a similar episode at 

home.  He said he does not remember any of the details but his wife called EMS. 

In the ER he was found to have numerous electrolyte disturbances that required 

replenishment as well.  He said it has been a couple of days since he has had a 

drink.  His ethanol level was undetectable.








Past Medical History


Cardiac Medical History: Reports: Congestive Heart Failure - Cardiomyopathy, 

Hypertension


   Denies: Coronary Artery Disease, Myocardial Infarction


Pulmonary Medical History: Reports: Chronic Obstructive Pulmonary Disease (COPD)


   Denies: Asthma, Bronchitis, Pneumonia


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


Musculoskeltal Medical History: Reports: Arthritis


Psychiatric Medical History: Reports: Post Traumatic Stress Disorder


   Denies: Depression


Hematology: 


   Denies: Anemia





Past Surgical History


Past Surgical History: Reports: Orthopedic Surgery - neck





Social History


Smoking Status: Former Smoker


Electronic Cigarette use?: No


Frequency of Alcohol Use: Heavy


Hx Recreational Drug Use: No


Drugs: None


Hx Prescription Drug Abuse: No





- Advance Directive


Resuscitation Status: Full Code





Family History


Family History: Hypertension, Other - Mom  age 74 Alzheimer's


Parental Family History Reviewed: Yes


Children Family History Reviewed: Yes


Sibling(s) Family History Reviewed.: Yes





Medication/Allergy


Home Medications: 








Amlodipine Besylate [Norvasc 5 mg Tablet] 5 mg PO DAILY 19 


Carvedilol [Coreg 3.125 mg Tablet] 3.125 mg PO BID 19 


Fluticasone Propionate [Flonase Nasal Spray 50 Mcg/Spray 16 gm] 2 spray NASL 

DAILY 19 


Furosemide [Lasix 40 mg Tablet] 40 mg PO DAILY 19 


Montelukast Sodium [Singulair 10 mg Tablet] 10 mg PO QPM 19 


Potassium Chloride [Klor-Con M20] 20 meq PO DAILY 19 


Tamsulosin HCl [Flomax 0.4 mg Cap.sr] 0.4 mg PO DAILY 19 


Tizanidine HCl [Zanaflex] 4 mg PO QHS 19 


Trazodone HCl [Desyrel 50 mg Tablet] 50 mg PO QPM 19 








Allergies/Adverse Reactions: 


                                        





No Known Allergies Allergy (Verified 17 05:01)


   











Review of Systems


ROS unobtainable: Due to mental status





Physical Exam


Vital Signs: 


                                        











Temp Pulse Resp BP Pulse Ox


 


 97.4 F   121 H  18   123/96 H  94 


 


 19 09:52  19 09:52  19 09:52  19 09:52  19 09:52








                                 Intake & Output











 19





 06:59 06:59 06:59


 


Intake Total 1000 4585 240


 


Balance 1000 4585 240


 


Weight 101 kg 98.7 kg 











General appearance: PRESENT: cooperative, disheveled, mild distress


Head exam: PRESENT: atraumatic, normocephalic


Eye exam: PRESENT: EOMI, PERRLA.  ABSENT: conjunctival injection, nystagmus, 

scleral icterus


Ear exam: PRESENT: normal external ear exam


Mouth exam: PRESENT: dry mucosa, neck supple


Teeth exam: PRESENT: poor dentation


Throat exam: ABSENT: post pharyngeal erythema


Neck exam: PRESENT: full ROM.  ABSENT: carotid bruit, JVD, lymphadenopathy, 

meningismus, tenderness, thyromegaly


Respiratory exam: PRESENT: clear to auscultation dax, symmetrical, unlabored.  

ABSENT: accessory muscle use, chest wall tenderness, crackles, prolonged 

expiratory phas, rhonchi, tachypnea, wheezes


Cardiovascular exam: PRESENT: +S1, +S2, tachycardia


Pulses: PRESENT: normal carotid pulses


Vascular exam: PRESENT: normal capillary refill


GI/Abdominal exam: PRESENT: normal bowel sounds, soft.  ABSENT: distended, 

guarding, rebound, tenderness


Extremities exam: ABSENT: clubbing, pedal edema


Musculoskeletal exam: PRESENT: normal inspection.  ABSENT: deformity


Neurological exam: PRESENT: alert, awake, oriented to person, oriented to place,

CN II-XII grossly intact, other - Very tremulous.  ABSENT: motor sensory deficit


Psychiatric exam: PRESENT: anxious


Skin exam: PRESENT: dry, warm





Results


Laboratory Results: 


                                        





                                 19 04:28 





                                 19 04:28 





                                        











  19





  04:28 04:28


 


WBC  9.2 


 


RBC  4.73 


 


Hgb  15.5 


 


Hct  45.1 


 


MCV  95 


 


MCH  32.7 


 


MCHC  34.3 


 


RDW  16.6 H 


 


Plt Count  155 


 


Sodium   135.7 L


 


Potassium   3.5 L


 


Chloride   104


 


Carbon Dioxide   19 L


 


Anion Gap   13


 


BUN   10


 


Creatinine   1.18


 


Est GFR (African Amer)   > 60


 


Glucose   98


 


Calcium   9.0


 


Magnesium   2.1








                                        











  19





  04:43


 


Troponin I  < 0.012











Impressions: 


                                        





Chest X-Ray  19 05:17


IMPRESSION:


 


No acute cardiopulmonary findings.


 








Head CT  19 05:17


IMPRESSION: No acute findings.


 














Assessment and Plan





- Diagnosis


(1) Alcohol withdrawal seizure


Qualifiers: 


   Complication of substance-induced condition: with delirium   Qualified 

Code(s): F10.231 - Alcohol dependence with withdrawal delirium   


Is this a current diagnosis for this admission?: Yes   


Plan: 


Will give him PRN Valium for withdrawal symptoms and put him on seizure 

precautions.  We will also give him a banana bag daily.








(2) Hypocalcemia


Is this a current diagnosis for this admission?: Yes   


Plan: 


We will replace intravenously








(3) Hypokalemia


Is this a current diagnosis for this admission?: Yes   


Plan: 


We will replace intravenously and keep him on the monitor








(4) Hypomagnesemia


Is this a current diagnosis for this admission?: Yes   


Plan: 


We will replace intravenously and keep him on a cardiac monitor








- Time


Time Spent with patient: 35 or more minutes





- Inpatient Certification


Based on my medical assessment, after consideration of the patient's 

comorbidities, presenting symptoms, or acuity I expect that the services needed 

warrant INPATIENT care.: Yes


I certify that my determination is in accordance with my understanding of 

Medicare's requirements for reasonable and necessary INPATIENT services [42 CFR 

412.3e].: Yes


Medical Necessity: Significant Comorbidiites Make Outpatient Treatment Too 

Risky, Need Close Monitoring Due to Risk of Patient Decompensation, Need For IV 

Fluids, Need For Continuous Telemetry Monitoring, Need for Neurological Checks, 

Risk of Complication if Not Cared For in Hospital

## 2019-11-24 NOTE — PDOC DISCHARGE SUMMARY
Impression





- Admit/DC Date/PCP


Admission Date/Primary Care Provider: 


  11/23/19 08:51





  





Discharge Date: 11/24/19





- Additional Information


Resuscitation Status: Full Code


Discharge Diet: Cardiac


Discharge Activity: No Driving, Slowly Increase Activity


Referrals: 


BRENDAN CRUZ PA-C [NO LOCAL MD] - 


Home Medications: 








Amlodipine Besylate [Norvasc 5 mg Tablet] 5 mg PO DAILY 11/23/19 


Carvedilol [Coreg 3.125 mg Tablet] 3.125 mg PO BID 11/23/19 


Fluticasone Propionate [Flonase Nasal Spray 50 Mcg/Spray 16 gm] 2 spray NASL CLARENCE

LY 11/23/19 


Furosemide [Lasix 40 mg Tablet] 40 mg PO DAILY 11/23/19 


Montelukast Sodium [Singulair 10 mg Tablet] 10 mg PO QPM 11/23/19 


Potassium Chloride [Klor-Con M20] 20 meq PO DAILY 11/23/19 


Tamsulosin HCl [Flomax 0.4 mg Cap.sr] 0.4 mg PO DAILY 11/23/19 


Tizanidine HCl [Zanaflex] 4 mg PO QHS 11/23/19 


Trazodone HCl [Desyrel 50 mg Tablet] 50 mg PO QPM 11/23/19 











History of Present Illiness


History of Present Illness: 


KRISTA DAILY is a 59 year old male with a history of alcoholism has had alcohol-

related withdrawal seizures in the past who presents with a similar episode at 

home.  He said he does not remember any of the details but his wife called EMS. 

In the ER he was found to have numerous electrolyte disturbances that required 

replenishment as well.  He said it has been a couple of days since he has had a 

drink.  His ethanol level was undetectable.








Hospital Course


Hospital Course: 


His electrolytes were repleted and he was hydrated.  He was given Valium on an 

as-needed basis for tremulousness and withdrawal symptoms.  He was calm and at 

rest today and said that he did not plan to stop drinking.  His said that if he 

got into "bad shape" again he would just "come back here and let you guys fix me

up."  He was strongly encouraged to stop drinking, and if he ever decides to do 

so he should seek a medical detoxification and not try to do it on his own at 

home.  He verbalizes understanding.  He was discharged in stable condition.





Physical Exam


Vital Signs: 


                                        











Temp Pulse Resp BP Pulse Ox


 


 97.4 F   121 H  18   123/96 H  94 


 


 11/24/19 09:52  11/24/19 09:52  11/24/19 09:52  11/24/19 09:52  11/24/19 09:52








                                 Intake & Output











 11/23/19 11/24/19 11/25/19





 06:59 06:59 06:59


 


Intake Total 1000 4585 240


 


Balance 1000 4585 240


 


Weight 101 kg 98.7 kg 











General appearance: PRESENT: no acute distress, cooperative, disheveled


Respiratory exam: PRESENT: clear to auscultation dax, symmetrical, unlabored.  

ABSENT: accessory muscle use, chest wall tenderness, crackles, prolonged 

expiratory phas, rhonchi, tachypnea, wheezes


Cardiovascular exam: PRESENT: RRR, +S1, +S2


Pulses: PRESENT: normal carotid pulses


Vascular exam: PRESENT: normal capillary refill


GI/Abdominal exam: PRESENT: normal bowel sounds, soft.  ABSENT: distended, 

guarding, rebound, tenderness


Extremities exam: ABSENT: clubbing, pedal edema


Musculoskeletal exam: PRESENT: normal inspection.  ABSENT: deformity


Neurological exam: PRESENT: alert, awake, oriented to person, oriented to place,

oriented to situation


Psychiatric exam: PRESENT: appropriate affect, normal mood


Skin exam: PRESENT: dry, warm





Results


Laboratory Results: 


                                        











WBC  9.2 10^3/uL (4.0-10.5)   11/24/19  04:28    


 


RBC  4.73 10^6/uL (4.35-5.55)   11/24/19  04:28    


 


Hgb  15.5 g/dL (13.5-17.0)   11/24/19  04:28    


 


Hct  45.1 % (37.9-51.0)   11/24/19  04:28    


 


MCV  95 fl (80-97)   11/24/19  04:28    


 


MCH  32.7 pg (27.0-33.4)   11/24/19  04:28    


 


MCHC  34.3 g/dL (32.0-36.0)   11/24/19  04:28    


 


RDW  16.6 % (11.5-14.0)  H  11/24/19  04:28    


 


Plt Count  155 10^3/uL (150-450)   11/24/19  04:28    


 


Lymph % (Auto)  14.7 % (13-45)   11/23/19  04:43    


 


Mono % (Auto)  6.7 % (3-13)   11/23/19  04:43    


 


Eos % (Auto)  0.4 % (0-6)   11/23/19  04:43    


 


Baso % (Auto)  0.1 % (0-2)   11/23/19  04:43    


 


Absolute Neuts (auto)  10.2 10^3/uL (1.7-8.2)  H  11/23/19  04:43    


 


Absolute Lymphs (auto)  1.9 10^3/uL (0.5-4.7)   11/23/19  04:43    


 


Absolute Monos (auto)  0.9 10^3/uL (0.1-1.4)   11/23/19  04:43    


 


Absolute Eos (auto)  0.0 10^3/uL (0.0-0.6)   11/23/19  04:43    


 


Absolute Basos (auto)  0.0 10^3/uL (0.0-0.2)   11/23/19  04:43    


 


Seg Neutrophils %  78.1 % (42-78)  H  11/23/19  04:43    


 


Sodium  135.7 mmol/L (137-145)  L  11/24/19  04:28    


 


Potassium  3.5 mmol/L (3.6-5.0)  L  11/24/19  04:28    


 


Chloride  104 mmol/L ()   11/24/19  04:28    


 


Carbon Dioxide  19 mmol/L (22-30)  L  11/24/19  04:28    


 


Anion Gap  13  (5-19)   11/24/19  04:28    


 


BUN  10 mg/dL (7-20)   11/24/19  04:28    


 


Creatinine  1.18 mg/dL (0.52-1.25)   11/24/19  04:28    


 


Est GFR ( Amer)  > 60  (>60)   11/24/19  04:28    


 


Est GFR (MDRD) Non-Af  > 60  (>60)   11/24/19  04:28    


 


Glucose  98 mg/dL ()   11/24/19  04:28    


 


Calcium  9.0 mg/dL (8.4-10.2)   11/24/19  04:28    


 


Magnesium  2.1 mg/dL (1.6-2.3)   11/24/19  04:28    


 


Total Bilirubin  1.2 mg/dL (0.2-1.3)   11/23/19  04:43    


 


Direct Bilirubin  0.3 mg/dL (0.0-0.4)   11/23/19  04:43    


 


Neonat Total Bilirubin  Not Reportable   11/23/19  04:43    


 


Neonat Direct Bilirubin  Not Reportable   11/23/19  04:43    


 


Neonat Indirect Bili  Not Reportable   11/23/19  04:43    


 


AST  31 U/L (17-59)   11/23/19  04:43    


 


ALT  20 U/L (<50)   11/23/19  04:43    


 


Alkaline Phosphatase  55 U/L ()   11/23/19  04:43    


 


Troponin I  < 0.012 ng/mL  11/23/19  04:43    


 


Total Protein  5.4 g/dL (6.3-8.2)  L  11/23/19  04:43    


 


Albumin  2.6 g/dL (3.5-5.0)  L  11/23/19  04:43    


 


Urine Color  DARK YELLOW   11/23/19  08:54    


 


Urine Appearance  SLIGHTLY-CLOUDY   11/23/19  08:54    


 


Urine pH  6.0  (5.0-9.0)   11/23/19  08:54    


 


Ur Specific Gravity  1.016   11/23/19  08:54    


 


Urine Protein  100 mg/dL (NEGATIVE)  H  11/23/19  08:54    


 


Urine Glucose (UA)  NEGATIVE mg/dL (NEGATIVE)   11/23/19  08:54    


 


Urine Ketones  TRACE mg/dL (NEGATIVE)  H  11/23/19  08:54    


 


Urine Blood  SMALL  (NEGATIVE)  H  11/23/19  08:54    


 


Urine Nitrite  NEGATIVE  (NEGATIVE)   11/23/19  08:54    


 


Urine Bilirubin  NEGATIVE  (NEGATIVE)   11/23/19  08:54    


 


Urine Urobilinogen  NEGATIVE mg/dL (<2.0)   11/23/19  08:54    


 


Ur Leukocyte Esterase  MODERATE  (NEGATIVE)  H  11/23/19  08:54    


 


Urine WBC (Auto)  2 /HPF  11/23/19  08:54    


 


Urine RBC (Auto)  1 /HPF  11/23/19  08:54    


 


U Hyaline Cast (Auto)  8 /LPF  11/23/19  08:54    


 


Squamous Epi Cells Auto  2 /HPF  11/23/19  08:54    


 


Urine Mucus (Auto)  RARE /LPF  11/23/19  08:54    


 


Urine Ascorbic Acid  NEGATIVE  (NEGATIVE)   11/23/19  08:54    


 


Urine Opiates Screen  NEGATIVE   11/23/19  08:54    


 


Urine Methadone Screen  NEGATIVE   11/23/19  08:54    


 


Ur Barbiturates Screen  NEGATIVE   11/23/19  08:54    


 


Ur Phencyclidine Scrn  NEGATIVE   11/23/19  08:54    


 


Ur Amphetamines Screen  NEGATIVE   11/23/19  08:54    


 


U Benzodiazepines Scrn  UNCONFIRMED POSITIVE   11/23/19  08:54    


 


Urine Cocaine Screen  NEGATIVE   11/23/19  08:54    


 


U Marijuana (THC) Screen  NEGATIVE   11/23/19  08:54    


 


Serum Alcohol  < 10 mg/dL (NONE DETECTED)   11/23/19  04:43    








                                        











  11/23/19





  04:43


 


Troponin I  < 0.012











Impressions: 


                                        





Chest X-Ray  11/23/19 05:17


IMPRESSION:


 


No acute cardiopulmonary findings.


 








Head CT  11/23/19 05:17


IMPRESSION: No acute findings.


 














Plan


Time Spent: Greater than 30 Minutes





Stroke


Is this a Stroke Patient?: No





Acute Heart Failure





- **


Is this a Heart Failure Patient?: No

## 2022-08-25 PROBLEM — G89.29 ELBOW PAIN, CHRONIC, LEFT: Status: ACTIVE | Noted: 2022-08-25

## 2022-08-25 PROBLEM — M25.522 ELBOW PAIN, CHRONIC, LEFT: Status: ACTIVE | Noted: 2022-08-25
